# Patient Record
Sex: MALE | Race: AMERICAN INDIAN OR ALASKA NATIVE | NOT HISPANIC OR LATINO
[De-identification: names, ages, dates, MRNs, and addresses within clinical notes are randomized per-mention and may not be internally consistent; named-entity substitution may affect disease eponyms.]

---
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
## 2021-04-06 ENCOUNTER — TRANSCRIPTION ENCOUNTER (OUTPATIENT)
Age: 21
End: 2021-04-06

## 2022-02-24 ENCOUNTER — TRANSCRIPTION ENCOUNTER (OUTPATIENT)
Age: 22
End: 2022-02-24

## 2022-04-10 ENCOUNTER — TRANSCRIPTION ENCOUNTER (OUTPATIENT)
Age: 22
End: 2022-04-10

## 2022-05-22 NOTE — ED PEDIATRIC TRIAGE NOTE - NS ED TRIAGE AVPU SCALE
Skin normal color for race, warm, dry and intact. No evidence of rash. Alert-The patient is alert, awake and responds to voice. The patient is oriented to time, place, and person. The triage nurse is able to obtain subjective information.

## 2022-06-06 ENCOUNTER — NON-APPOINTMENT (OUTPATIENT)
Age: 22
End: 2022-06-06

## 2023-01-31 ENCOUNTER — NON-APPOINTMENT (OUTPATIENT)
Age: 23
End: 2023-01-31

## 2023-02-28 ENCOUNTER — RESULT CHARGE (OUTPATIENT)
Age: 23
End: 2023-02-28

## 2023-03-01 ENCOUNTER — APPOINTMENT (OUTPATIENT)
Dept: CARDIOLOGY | Facility: CLINIC | Age: 23
End: 2023-03-01
Payer: MEDICAID

## 2023-03-01 VITALS
HEART RATE: 87 BPM | RESPIRATION RATE: 20 BRPM | WEIGHT: 240 LBS | TEMPERATURE: 98.4 F | HEIGHT: 71 IN | BODY MASS INDEX: 33.6 KG/M2

## 2023-03-01 VITALS — SYSTOLIC BLOOD PRESSURE: 138 MMHG | DIASTOLIC BLOOD PRESSURE: 74 MMHG

## 2023-03-01 DIAGNOSIS — Z78.9 OTHER SPECIFIED HEALTH STATUS: ICD-10-CM

## 2023-03-01 PROCEDURE — 93000 ELECTROCARDIOGRAM COMPLETE: CPT

## 2023-03-01 PROCEDURE — 93306 TTE W/DOPPLER COMPLETE: CPT

## 2023-03-01 PROCEDURE — 99204 OFFICE O/P NEW MOD 45 MIN: CPT

## 2023-03-01 RX ORDER — VIT A AND D3 IN COD LIVER OIL 1250-135
CAPSULE ORAL
Refills: 0 | Status: ACTIVE | COMMUNITY

## 2023-03-01 NOTE — PHYSICAL EXAM
[Well Developed] : well developed [Well Nourished] : well nourished [No Acute Distress] : no acute distress [Normal Conjunctiva] : normal conjunctiva [Normal Venous Pressure] : normal venous pressure [No Carotid Bruit] : no carotid bruit [Normal S1, S2] : normal S1, S2 [No Murmur] : no murmur [No Rub] : no rub [No Gallop] : no gallop [Clear Lung Fields] : clear lung fields [Good Air Entry] : good air entry [No Respiratory Distress] : no respiratory distress  [Soft] : abdomen soft [Non Tender] : non-tender [No Masses/organomegaly] : no masses/organomegaly [Normal Bowel Sounds] : normal bowel sounds [Normal Gait] : normal gait [No Edema] : no edema [No Cyanosis] : no cyanosis [No Clubbing] : no clubbing [No Varicosities] : no varicosities [No Rash] : no rash [No Skin Lesions] : no skin lesions [Moves all extremities] : moves all extremities [No Focal Deficits] : no focal deficits [Alert and Oriented] : alert and oriented [Normal memory] : normal memory

## 2023-03-01 NOTE — HISTORY OF PRESENT ILLNESS
[FreeTextEntry1] : SANDRA MORALES is a 22 year old man with Autism who presents to establish care. \par \par He is here with his mother because she has noticed that his blood pressure is high at home, for example the SBP is in the 130-140 mmHg range. She has also noticed that his heart rate is high. The patient is unable to describe pain. She says she does not notice him clutching his chest or short of breath. He has been bursting into a sweat a lot lately  however. She has not noticed any leg swelling. He has not had any syncopal episodes. \par

## 2023-03-01 NOTE — ASSESSMENT
[FreeTextEntry1] : 22 year old man with Autism who presents to [facility] care. \par \par 1. HTN: BP above goal in the office today and above goal at home. His goal is <130/80 mmHg. \par - Echocardiogram to rule out structural abnormalities\par - We discussed the importance of doing 30 minutes of moderate intensity activity, at least 5 times a week\par - We discussed the importance of adhering to a low sodium diet of <2000 mg of sodium per day\par - We will try lifestyle modifications before starting medications\par - TSH is normal; will discuss secondary hypertension labs at next visit\par - They will keep a BP log and bring for me at next visit\par \par 2. HLD: Family history of CAD. His LDL is above goal of <100. Again, we will try lifestyle modifications and check lipid panel before next visit. \par \par RTC in one month. \par

## 2023-04-12 ENCOUNTER — APPOINTMENT (OUTPATIENT)
Dept: CARDIOLOGY | Facility: CLINIC | Age: 23
End: 2023-04-12
Payer: MEDICAID

## 2023-04-12 VITALS
HEIGHT: 71 IN | RESPIRATION RATE: 18 BRPM | SYSTOLIC BLOOD PRESSURE: 126 MMHG | WEIGHT: 236 LBS | HEART RATE: 86 BPM | OXYGEN SATURATION: 99 % | DIASTOLIC BLOOD PRESSURE: 84 MMHG | TEMPERATURE: 98.3 F | BODY MASS INDEX: 33.04 KG/M2

## 2023-04-12 VITALS — SYSTOLIC BLOOD PRESSURE: 126 MMHG | DIASTOLIC BLOOD PRESSURE: 84 MMHG

## 2023-04-12 DIAGNOSIS — Z01.810 ENCOUNTER FOR PREPROCEDURAL CARDIOVASCULAR EXAMINATION: ICD-10-CM

## 2023-04-12 PROCEDURE — 93000 ELECTROCARDIOGRAM COMPLETE: CPT

## 2023-04-12 PROCEDURE — 99214 OFFICE O/P EST MOD 30 MIN: CPT

## 2023-04-12 NOTE — CARDIOLOGY SUMMARY
[de-identified] : 4/12/23: sinus rhythm, nonspecific ST and T wave changes [de-identified] : 3/1/23: Normal left and right ventricular size and systolic function. No significant valve disease.\par

## 2023-04-12 NOTE — ASSESSMENT
[FreeTextEntry1] : 22 year old man with Autism, HLD and HTN who presents for follow up today. \par \par 1. Preoperative risk stratification: His functional capacity is >4 METS. He cannot describe anginal symptoms. EKG is nonischemic and echo is normal. He is at low risk for low risk surgery (wisdom tooth removal). He may proceed from a cardiovascular standpoint without further testing. \par This consult serves as cardiovascular risk stratification for surgery. The ultimate decision to proceed with the surgery lies with the patient and the surgeon.\par \par 2. HTN: BP is normal in the office but high at home. His goal is <130/80 mmHg. They will bring their home BP monitor next week and have it checked for accuracy. If accurate, will start amlodipine 2.5mg daily. \par \par 3. HLD: LDL improved from 140 to 120 with lifestyle modifications. Continue. \par \par RTC in 3 months.

## 2023-04-12 NOTE — HISTORY OF PRESENT ILLNESS
[FreeTextEntry1] : SANDRA MORALES is a 22 year old man with Autism, HLD and HTN who presents for follow up today. \par \par He is here with his mother and sister today. They have been checking BP every night and systolic is in the 160s.  The patient is unable to describe pain. She says she does not notice him clutching his chest or short of breath. He has been bursting into a sweat a lot lately  however. She has not noticed any leg swelling. He has not had any syncopal episodes. \par \par He goes to the gym four days a week and does cardio for 15-30 minutes without complaints and also does weights.

## 2023-04-12 NOTE — PHYSICAL EXAM
[Well Developed] : well developed [Well Nourished] : well nourished [No Acute Distress] : no acute distress [Normal Conjunctiva] : normal conjunctiva [Normal Venous Pressure] : normal venous pressure [No Carotid Bruit] : no carotid bruit [Normal S1, S2] : normal S1, S2 [No Murmur] : no murmur [No Rub] : no rub [No Gallop] : no gallop [Clear Lung Fields] : clear lung fields [Good Air Entry] : good air entry [No Respiratory Distress] : no respiratory distress  [Soft] : abdomen soft [Non Tender] : non-tender [No Masses/organomegaly] : no masses/organomegaly [Normal Bowel Sounds] : normal bowel sounds [Normal Gait] : normal gait [No Edema] : no edema [No Cyanosis] : no cyanosis [No Clubbing] : no clubbing [No Varicosities] : no varicosities [No Rash] : no rash [No Skin Lesions] : no skin lesions [Moves all extremities] : moves all extremities [Alert and Oriented] : alert and oriented [No Focal Deficits] : no focal deficits [Normal memory] : normal memory

## 2023-04-14 ENCOUNTER — APPOINTMENT (OUTPATIENT)
Dept: OTOLARYNGOLOGY | Facility: CLINIC | Age: 23
End: 2023-04-14
Payer: MEDICAID

## 2023-04-14 PROCEDURE — 69210 REMOVE IMPACTED EAR WAX UNI: CPT

## 2023-04-14 NOTE — ASSESSMENT
[FreeTextEntry1] : Wax cleaned.\par \par History and discussion with patient's mother and sister.\par \par

## 2023-04-14 NOTE — PHYSICAL EXAM
[de-identified] : right impacted wax cleaned with curette [] : septum deviated to the left [Normal] : no abnormal secretions

## 2023-04-14 NOTE — HISTORY OF PRESENT ILLNESS
[de-identified] : Patient presents today with his mom c/o allergies , sore throat,  ear infections,  nasal congestion.  Patient sister states his behavior gets bad when he has allergies.  He c/o throat pain  and a stuffy nose.   He has a history of recurring ear infections.

## 2023-04-15 ENCOUNTER — NON-APPOINTMENT (OUTPATIENT)
Age: 23
End: 2023-04-15

## 2023-05-23 ENCOUNTER — EMERGENCY (EMERGENCY)
Facility: HOSPITAL | Age: 23
LOS: 0 days | Discharge: HOME | End: 2023-05-23
Attending: EMERGENCY MEDICINE
Payer: MEDICAID

## 2023-05-23 VITALS
HEART RATE: 75 BPM | TEMPERATURE: 99 F | SYSTOLIC BLOOD PRESSURE: 161 MMHG | DIASTOLIC BLOOD PRESSURE: 94 MMHG | OXYGEN SATURATION: 98 % | RESPIRATION RATE: 18 BRPM

## 2023-05-23 DIAGNOSIS — R51.9 HEADACHE, UNSPECIFIED: ICD-10-CM

## 2023-05-23 DIAGNOSIS — F84.0 AUTISTIC DISORDER: ICD-10-CM

## 2023-05-23 DIAGNOSIS — Z88.0 ALLERGY STATUS TO PENICILLIN: ICD-10-CM

## 2023-05-23 PROCEDURE — 70450 CT HEAD/BRAIN W/O DYE: CPT | Mod: 26,MA

## 2023-05-23 PROCEDURE — 99284 EMERGENCY DEPT VISIT MOD MDM: CPT | Mod: 25

## 2023-05-23 PROCEDURE — 99284 EMERGENCY DEPT VISIT MOD MDM: CPT

## 2023-05-23 PROCEDURE — 70450 CT HEAD/BRAIN W/O DYE: CPT | Mod: MA

## 2023-05-23 RX ORDER — ACETAMINOPHEN 500 MG
650 TABLET ORAL ONCE
Refills: 0 | Status: COMPLETED | OUTPATIENT
Start: 2023-05-23 | End: 2023-05-23

## 2023-05-23 RX ORDER — METOCLOPRAMIDE HCL 10 MG
10 TABLET ORAL ONCE
Refills: 0 | Status: COMPLETED | OUTPATIENT
Start: 2023-05-23 | End: 2023-05-23

## 2023-05-23 RX ADMIN — Medication 10 MILLIGRAM(S): at 16:28

## 2023-05-23 RX ADMIN — Medication 650 MILLIGRAM(S): at 16:28

## 2023-05-23 NOTE — ED PROVIDER NOTE - CLINICAL SUMMARY MEDICAL DECISION MAKING FREE TEXT BOX
24 y.o. male, PMH of autism, non-verbal, BIB mom for evaluation. Mom states that for the last week, pt had a few episodes where he grabbed his head and appeared uncomfortable. Never passed out. Never has seizure-like activity. No fever/chills. Episodes usually last 10-15 min and pt goes back to normal. Patient has appointment with neurologist in July for MRI and EEG. On exam, pt in NAD, awake, watching cartoons, head NC/AT, CN II-XII intact, PEERL, EOMi, neck (-) midline tenderness, lungs CTA B/L, CV S1S2 regular, abdomen soft/NT/ND/(+)BS, ext (-) edema, motor 5/5x4, sensation intact, ambulating with steady gait. CT (-). Pt appears comfortable in the ED. Will d/c with neuro follow up.

## 2023-05-23 NOTE — ED PROVIDER NOTE - CARE PROVIDERS DIRECT ADDRESSES
[email or web address],DirectAddress_Unknown

## 2023-05-23 NOTE — ED PROVIDER NOTE - CARE PROVIDER_API CALL
Christian Ozuna)  Neurology  [address] [facility], [facility] [address]  [place] [address]  Phone: [phone]  Fax: [phone]  Follow Up Time: Routine    Chay Kirk  Neurology  [address] [place] [address]  Phone: [phone]  Fax: [phone]  Follow Up Time: Routine    Denia Adames)  Neurology; Vascular Neurology  [address] [place] [address], [address] [id number]  Phone: [phone]  Fax: [phone]  Follow Up Time: Routine    Cruz Wakefield)  Neurology  [address] [place] [address]  Phone: [phone]  Fax: [phone]  Follow Up Time: Routine

## 2023-05-23 NOTE — ED PROVIDER NOTE - OBJECTIVE STATEMENT
24-year-old male with past history of autism presents with complaints of headache and mother states patient has been staring off into space into one direction and not acting himself for the past few weeks.  Noticed denies any tonic-clonic movements and history of seizures denies any head trauma.  Mother denies any subjective fever.  Patient has appointment with neurologist in July for MRI and EEG.

## 2023-05-23 NOTE — ED PROVIDER NOTE - PATIENT PORTAL LINK FT
You can access the FollowMyHealth Patient Portal offered by [facility] by registering at the following website: [email or web address]. By joining [employer]’s FollowMyHealth portal, you will also be able to view your health information using other applications (apps) compatible with our system.

## 2023-05-23 NOTE — ED ADULT TRIAGE NOTE - NS ED NURSE BANDS TYPE
"Adult Outpatient Nutrition  Assessment/PES    Patient Name: Ary Soliz  YOB: 1990  MRN: [id number]      Assessment Date: 12/01/22      This medical referred consult was provided via telehealth as patient was unable to attend an in-office appointment today due to the COVID-19 crisis. Consent for treatment was given verbally. RD spent a total of 60 minutes with patient today.      Reason for visit:     Ary is a 31 yo female who is referred today for weight loss and healthy eating education.     Session Details:     Attendees: Ary  Language/communication details: Appointment via telephone   Barriers to learning: None noted  Health Literacy Assessment: Not completed prior to appointment    Anthropometrics:     Ht Readings from Last 1 Encounters:   09/15/17 157.5 cm (62\")      Wt Readings from Last 1 Encounters:   09/15/17 90.7 kg (200 lb)      BMI Readings from Last 1 Encounters:   09/15/17 36.58 kg/m²      Patient weight not recorded              Recent weight change: Slow progressive weight re-gain (50-60#)    Pertinent Labs:     None    Pertinent Medications/Supplements:    None     Nutrition Assessment:       Food insecurity: Denies  Food allergies: Denies   Intolerances/aversions: Denies  Difficulty chewing: Denies  Difficulty swallowing: Denies  Gastrointestinal symptoms that impact intake or food choices: Denies  Diet requirements related to medical condition, personal preference or cultural belief:  is vegetarian. Ary does eat meat but in limited amounts.  Previous nutrition education/information: None noted   Barriers to following/maintaining a diet/healthy eating plan: Knowing what to eat, portion control.  Support plan:     Diet recall:     24 hr recall available in Nutrition Assessment Questionnaire     Nutrition assessment comments: Ary follows a regular diet with limited intake of meat. 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 She grew up eating a lot of fast food and reports challenges with gauging how much to eat and what exactly to eat. She doesn't consistently skip meals, however she does skip meals on occasion.     Physical activity:      Barriers to physical activity: Not assessed    Physical activity comments: Not assessed       PES Statement:     Food and nutrition related knowledge deficit related to no prior work with RD on healthy eating as evidenced by referral to RD for education on healthy eating to support weight loss/healthy weight.    Discussion/Education:    Our conversation focused on the importance of eating throughout the day and avoiding large spans of time without eating. We discussed the idea of setting a consistent meal pattern and some different meal patterns she might consider. We discussed the idea of being realistic with her work and home schedule to set herself up for success. We also focused on meal content. We discussed a balanced meal and what makes up a healthy plate. We discussed the importance of including all macronutrients with each meal and the rationale for that. We also discussed examples of some of her meals and how they could be adjusted to better incorporate all foods to make a more balanced plate.     Intervention Goal(s):     1. Drink at least 64 oz water (or other non-caffeinated beverage) daily   2. Eat every 4-6 hours (meal or snack) during awake hours     Resources Provided:     1. [facility] Weight Loss Toolkit  2. Start Simply with My Plate   3. Healthy Snack Ideas  4. Quick meal ideas             Total of 60 minutes spent with patient on nutrition counseling. Education based on Academy of Nutrition and Dietetics guidelines. Patient was provided with RD's contact information. Follow up visit is scheduled for TBD. Thank you for this referral.                     Name band;

## 2023-05-23 NOTE — ED PROVIDER NOTE - NSFOLLOWUPINSTRUCTIONS_ED_ALL_ED_FT
Headache    A headache is pain or discomfort felt around the head or neck area. The specific cause of a headache may not be found as there are many types including tension headaches, migraine headaches, and cluster headaches. Watch your condition for any changes. Things you can do to manage your pain include taking over the counter and prescription medications as instructed by your health care provider, lying down in a dark quiet room, limiting stress, getting regular sleep, and refraining from alcohol and tobacco products.    SEEK IMMEDIATE MEDICAL CARE IF YOU HAVE ANY OF THE FOLLOWING SYMPTOMS: fever, vomiting, stiff neck, loss of vision, problems with speech, muscle weakness, loss of balance, trouble walking, passing out, or confusion.    HERE ARE SOME NEUROLOGIST WHO MAY BE ABLE TO HELP YOU AND THEIR LOCATIONS:     [facility]  NEUROLOGY OFFICE LOCATIONS AND PHONE NUMBERS      [address], [address], [place] [address]  Opens in [employer]      [phone]    [address] [place] [address], [address] [address]  Opens in [employer]      [phone]    [address] [place] [address]  Opens in [employer]      [phone]    [address] [place] [address]  Opens in [employer]      [phone]    [address] [place] [address]  Opens in [employer]      [phone]    [address] [place] [address]  Opens in [employer]      [phone]      [facility]  NEUROLOGY      [phone]

## 2023-05-23 NOTE — ED ADULT NURSE NOTE - NSFALLUNIVINTERV_ED_ALL_ED
Bed/Stretcher in lowest position, wheels locked, appropriate side rails in place/Call bell, personal items and telephone in reach/Instruct patient to call for assistance before getting out of bed/chair/stretcher/Non-slip footwear applied when patient is off stretcher/[place] to call system/Physically safe environment - no spills, clutter or unnecessary equipment/Purposeful proactive rounding/Room/bathroom lighting operational, light cord in reach

## 2023-05-23 NOTE — ED PROVIDER NOTE - PROVIDER TOKENS
PROVIDER:[TOKEN:[[id number]],FOLLOWUP:[Routine]],PROVIDER:[TOKEN:[[id number]],FOLLOWUP:[Routine]],PROVIDER:[TOKEN:[[id number]],FOLLOWUP:[Routine]],PROVIDER:[TOKEN:[[id number]],FOLLOWUP:[Routine]]

## 2023-06-11 ENCOUNTER — RESULT CHARGE (OUTPATIENT)
Age: 23
End: 2023-06-11

## 2023-06-12 ENCOUNTER — APPOINTMENT (OUTPATIENT)
Dept: CARDIOLOGY | Facility: CLINIC | Age: 23
End: 2023-06-12
Payer: MEDICAID

## 2023-06-12 VITALS
WEIGHT: 239 LBS | TEMPERATURE: 98.3 F | RESPIRATION RATE: 20 BRPM | DIASTOLIC BLOOD PRESSURE: 78 MMHG | HEIGHT: 71 IN | OXYGEN SATURATION: 97 % | SYSTOLIC BLOOD PRESSURE: 124 MMHG | BODY MASS INDEX: 33.46 KG/M2 | HEART RATE: 90 BPM

## 2023-06-12 PROCEDURE — 99214 OFFICE O/P EST MOD 30 MIN: CPT

## 2023-06-12 PROCEDURE — 93000 ELECTROCARDIOGRAM COMPLETE: CPT

## 2023-06-12 NOTE — HISTORY OF PRESENT ILLNESS
[FreeTextEntry1] : SANDRA MORALES is a 23 year old man with Autism, HLD and HTN who presents for follow up today. \par \par He is here with his mom today. His SBP is in the 120s at home, however we checked his machine against ours today and SBP on the home machine is about 20 points higher (cuff is too small). The patient is unable to describe pain. She says she does not notice him clutching his chest or short of breath. He has been bursting into a sweat a lot lately  however. She has not noticed any leg swelling. He has not had any syncopal episodes. \par \par He goes to the gym four days a week and does cardio for 15-30 minutes without complaints and also does weights.

## 2023-06-12 NOTE — ASSESSMENT
[FreeTextEntry1] : 23 year old man with Autism, HLD and HTN who presents for follow up today. \par \par 1. HTN: His mom did not start amlodipine yet. His BP in the office is at goal. His goal is <130/80 mmHg. The home machine reads about 20 points higher than office, I think because cuff is too small. His mom is going to work on getting a bigger cuff and then will call us with readings. \par \par 3. HLD: LDL improved from 140 to 120 with lifestyle modifications. Continue. \par \par The primary prevention of heart disease was discussed in detail with the patient, including adhering to a heart healthy, plant based, or Mediterranean diet, and the importance of 30 minutes of moderate intensity activity for 30 minutes, 5 times a week. All the patient's questions were answered.\par \par \par RTC in 3 months.

## 2023-06-12 NOTE — CARDIOLOGY SUMMARY
[de-identified] : 6/12/23: sinus rhythm, nonspecific ST and T wave changes  [de-identified] : 3/1/23: Normal left and right ventricular size and systolic function. No significant valve disease

## 2023-07-06 ENCOUNTER — INPATIENT (INPATIENT)
Facility: HOSPITAL | Age: 23
LOS: 2 days | Discharge: HOME HEALTH | DRG: 757 | End: 2023-07-09
Attending: STUDENT IN AN ORGANIZED HEALTH CARE EDUCATION/TRAINING PROGRAM | Admitting: HOSPITALIST
Payer: MEDICAID

## 2023-07-06 VITALS — WEIGHT: 220.02 LBS

## 2023-07-06 DIAGNOSIS — Z74.3 NEED FOR CONTINUOUS SUPERVISION: ICD-10-CM

## 2023-07-06 DIAGNOSIS — F84.0 AUTISTIC DISORDER: ICD-10-CM

## 2023-07-06 DIAGNOSIS — Z78.1 PHYSICAL RESTRAINT STATUS: ICD-10-CM

## 2023-07-06 DIAGNOSIS — Z79.899 OTHER LONG TERM (CURRENT) DRUG THERAPY: ICD-10-CM

## 2023-07-06 DIAGNOSIS — R56.9 UNSPECIFIED CONVULSIONS: ICD-10-CM

## 2023-07-06 DIAGNOSIS — R45.1 RESTLESSNESS AND AGITATION: ICD-10-CM

## 2023-07-06 DIAGNOSIS — Z88.0 ALLERGY STATUS TO PENICILLIN: ICD-10-CM

## 2023-07-06 LAB
BASOPHILS # BLD AUTO: 0.05 K/UL — SIGNIFICANT CHANGE UP (ref 0–0.2)
BASOPHILS NFR BLD AUTO: 0.9 % — SIGNIFICANT CHANGE UP (ref 0–1)
EOSINOPHIL # BLD AUTO: 0.12 K/UL — SIGNIFICANT CHANGE UP (ref 0–0.7)
EOSINOPHIL NFR BLD AUTO: 2.3 % — SIGNIFICANT CHANGE UP (ref 0–8)
HCT VFR BLD CALC: 44.4 % — SIGNIFICANT CHANGE UP (ref 42–52)
HGB BLD-MCNC: 15 G/DL — SIGNIFICANT CHANGE UP (ref 14–18)
IMM GRANULOCYTES NFR BLD AUTO: 0.4 % — HIGH (ref 0.1–0.3)
LYMPHOCYTES # BLD AUTO: 2.14 K/UL — SIGNIFICANT CHANGE UP (ref 1.2–3.4)
LYMPHOCYTES # BLD AUTO: 40.2 % — SIGNIFICANT CHANGE UP (ref 20.5–51.1)
MCHC RBC-ENTMCNC: 28.4 PG — SIGNIFICANT CHANGE UP (ref 27–31)
MCHC RBC-ENTMCNC: 33.8 G/DL — SIGNIFICANT CHANGE UP (ref 32–37)
MCV RBC AUTO: 84.1 FL — SIGNIFICANT CHANGE UP (ref 80–94)
MONOCYTES # BLD AUTO: 0.48 K/UL — SIGNIFICANT CHANGE UP (ref 0.1–0.6)
MONOCYTES NFR BLD AUTO: 9 % — SIGNIFICANT CHANGE UP (ref 1.7–9.3)
NEUTROPHILS # BLD AUTO: 2.52 K/UL — SIGNIFICANT CHANGE UP (ref 1.4–6.5)
NEUTROPHILS NFR BLD AUTO: 47.2 % — SIGNIFICANT CHANGE UP (ref 42.2–75.2)
NRBC # BLD: 0 /100 WBCS — SIGNIFICANT CHANGE UP (ref 0–0)
PLATELET # BLD AUTO: 270 K/UL — SIGNIFICANT CHANGE UP (ref 130–400)
PMV BLD: 10.1 FL — SIGNIFICANT CHANGE UP (ref 7.4–10.4)
RBC # BLD: 5.28 M/UL — SIGNIFICANT CHANGE UP (ref 4.7–6.1)
RBC # FLD: 13.2 % — SIGNIFICANT CHANGE UP (ref 11.5–14.5)
WBC # BLD: 5.33 K/UL — SIGNIFICANT CHANGE UP (ref 4.8–10.8)
WBC # FLD AUTO: 5.33 K/UL — SIGNIFICANT CHANGE UP (ref 4.8–10.8)

## 2023-07-06 PROCEDURE — 85025 COMPLETE CBC W/AUTO DIFF WBC: CPT

## 2023-07-06 PROCEDURE — 95716 VEEG EA 12-26HR CONT MNTR: CPT

## 2023-07-06 PROCEDURE — 80053 COMPREHEN METABOLIC PANEL: CPT

## 2023-07-06 PROCEDURE — 81003 URINALYSIS AUTO W/O SCOPE: CPT

## 2023-07-06 PROCEDURE — 95700 EEG CONT REC W/VID EEG TECH: CPT

## 2023-07-06 PROCEDURE — 70450 CT HEAD/BRAIN W/O DYE: CPT

## 2023-07-06 PROCEDURE — 36415 COLL VENOUS BLD VENIPUNCTURE: CPT

## 2023-07-06 PROCEDURE — 99285 EMERGENCY DEPT VISIT HI MDM: CPT

## 2023-07-06 NOTE — ED ADULT NURSE NOTE - OBJECTIVE STATEMENT
Pt with complaints of having aggressive behaviors towards mom  when he has this sudden onset of blank stairs and then starts grabbing mom. Seen in [facility] few weeks ago and started him on new psyche meds.

## 2023-07-06 NOTE — ED PROVIDER NOTE - CLINICAL SUMMARY MEDICAL DECISION MAKING FREE TEXT BOX
23-year-old male presents to the ED for aggressive behavior.  Progressively worsening episodes.  History of autism.  Recently started on antipsychotics.  Difficulty with managing child symptoms at home.  Physical exam is unremarkable.  At baseline.  Prior history of seizures mother was concerned.  Consulted neurology with recommendations for VEEG.  Patient was admitted to the [facility]. CTH neg for ich, midline shift, or hydrocephalus.  Labs reviewed by me, values noted to be within normal limits.

## 2023-07-06 NOTE — ED PROVIDER NOTE - OBJECTIVE STATEMENT
Patient is a 24-year-old male with past history of autism presents with mom for aggressive behavior. Mother endorses several months of worsening aggressive behavior and episodes of staring off into space into one direction and not acting himself. Patient was recently started on antipsychotic medication but mother has failed to see improvement and she believes he needs an EEG/seizure workup. Mother states she has been experiencing issues with scheduling her son with an appointment due to his autism. Mother denies any fevers, nuchal rigidity, seizure-like activity, urinary symptoms, or additional acute issues. Patient is a 23-year-old male with past history of autism presents with mom for aggressive behavior. Mother endorses several months of worsening aggressive behavior and episodes of staring off into space into one direction and not acting himself. Patient was recently started on antipsychotic medication but mother has failed to see improvement and she believes he needs an EEG/seizure workup. Mother states she has been experiencing issues with scheduling her son with an appointment due to his autism. Mother denies any fevers, nuchal rigidity, seizure-like activity, urinary symptoms, or additional acute issues.

## 2023-07-06 NOTE — ED ADULT NURSE NOTE - NSFALLHARMRISKINTERV_ED_ALL_ED
Assistance OOB with selected safe patient handling equipment if applicable/Communicate risk of Fall with Harm to all staff, patient, and family/Encourage patient to sit up slowly, dangle for a short time, stand at bedside before walking/Orthostatic vital signs/Provide visual cue: red socks, yellow wristband, yellow gown, etc/Reinforce activity limits and safety measures with patient and family/Review medications for side effects contributing to fall risk/Toileting schedule using arm’s reach rule for commode and bathroom/Bed in lowest position, wheels locked, appropriate side rails in place/Call bell, personal items and telephone in reach/Instruct patient to call for assistance before getting out of bed/chair/stretcher/Non-slip footwear applied when patient is off stretcher/[place] to call system/Physically safe environment - no spills, clutter or unnecessary equipment/Purposeful Proactive Rounding/Room/bathroom lighting operational, light cord in reach

## 2023-07-06 NOTE — ED ADULT NURSE NOTE - NSFALLLASTSIX_ED_ALL_ED
Patient verified name and . Pt states last name is now Suzanne Jameson. Order for consent found in EHR and matches case posting; patient verifies procedure. Bilateral revision of reconstructed breasts with implant exchange, placement of acellular dermal matrix, elevation of right IMF, and autologous fat grafting    Type 2 surgery, PAT phone assessment complete. Orders received. Labs per surgeon: none  Labs per anesthesia protocol: Hgb- Pt instructed to come for lab work at entrance B (Monday- Friday 8:30 AM- 4:00 PM) prior to surgery day. Pt voiced an understanding. Patient answered medical/surgical history questions at their best of ability. All prior to admission medications documented in [facility]. Patient instructed to take the following medications the day of surgery according to anesthesia guidelines with a small sip of water: adderall, zyrtec, albuterol inhaler (bring) and prilosec. Hold all vitamins/supplements 7 days prior to surgery and NSAIDS 5 days prior to surgery. Patient instructed on the following:  Arrive at A Entrance, time of arrival to be called the day before by 1700  NPO after midnight including gum, mints, and ice chips  Responsible adult must drive patient to the hospital, stay during surgery, and patient will need supervision 24 hours after anesthesia  Use antibacterial soap in shower the night before surgery and on the morning of surgery  All piercings must be removed prior to arrival.    Leave all valuables (money and jewelry) at home but bring insurance card and ID on  DOS. Do not wear make-up, nail polish, lotions, cologne, perfumes, powders, or oil on skin. Patient teach back successful and patient demonstrates knowledge of instruction.
No.

## 2023-07-06 NOTE — ED ADULT NURSE NOTE - CHIEF COMPLAINT QUOTE
PT presents to the ED c/o of medical eval. Pt comes in with h/x of autism and has been acting more aggressive to mom recently and acting "differently" per mom. This has been going on since April. Pt placed in isolation room due to people excite him. Pt refusing to let RN or PCA due vitals in triage.

## 2023-07-06 NOTE — ED PROVIDER NOTE - PHYSICAL EXAMINATION
Patient is a 24-year-old male with past history of autism presents with mom for aggressive behavior. Mother endorses several months of worsening aggressive behavior and episodes of staring off into space into one direction and not acting himself. Patient was recently started on antipsychotic medication but mother has failed to see improvement and she believes he needs an EEG/seizure workup. Mother states she has been experiencing issues with scheduling her son with an appointment due to his autism. Mother denies any fevers, nuchal rigidity, seizure-like activity, urinary symptoms, or additional acute issues. VITAL SIGNS: I have reviewed nursing notes and confirm.  CONSTITUTIONAL: Non-toxic, in NAD  SKIN: Warm dry, normal skin turgor  HEAD: NCAT  EYES: No conjunctival injection, scleral anicteric  ENT: Moist mucous membranes, normal pharynx with no erythema or exudates  NECK: Supple; full ROM. Nontender. No cervical LAD  CARD: RRR, no murmurs, rubs or gallops  RESP: Clear to ausculation bilaterally.    ABD: Soft, non-distended, non-tender  EXT: Full ROM,  NEURO: Normal motor, normal sensory. Normal gait.  PSYCH: Cooperative, appropriate.

## 2023-07-06 NOTE — ED ADULT NURSE REASSESSMENT NOTE - NSFALLUNIVINTERV_ED_ALL_ED
Bed/Stretcher in lowest position, wheels locked, appropriate side rails in place/Call bell, personal items and telephone in reach/Instruct patient to call for assistance before getting out of bed/chair/stretcher/Non-slip footwear applied when patient is off stretcher/[place] to call system/Physically safe environment - no spills, clutter or unnecessary equipment/Purposeful proactive rounding/Room/bathroom lighting operational, light cord in reach

## 2023-07-06 NOTE — ED ADULT NURSE REASSESSMENT NOTE - NSFALLHARMRISKINTERV_ED_ALL_ED

## 2023-07-07 DIAGNOSIS — R41.82 ALTERED MENTAL STATUS, UNSPECIFIED: ICD-10-CM

## 2023-07-07 LAB
ALBUMIN SERPL ELPH-MCNC: 4.1 G/DL — SIGNIFICANT CHANGE UP (ref 3.5–5.2)
ALP SERPL-CCNC: 44 U/L — SIGNIFICANT CHANGE UP (ref 30–115)
ALT FLD-CCNC: 34 U/L — SIGNIFICANT CHANGE UP (ref 0–41)
ANION GAP SERPL CALC-SCNC: 10 MMOL/L — SIGNIFICANT CHANGE UP (ref 7–14)
ANION GAP SERPL CALC-SCNC: 12 MMOL/L — SIGNIFICANT CHANGE UP (ref 7–14)
APPEARANCE UR: CLEAR — SIGNIFICANT CHANGE UP
AST SERPL-CCNC: 20 U/L — SIGNIFICANT CHANGE UP (ref 0–41)
BASOPHILS # BLD AUTO: 0.05 K/UL — SIGNIFICANT CHANGE UP (ref 0–0.2)
BASOPHILS NFR BLD AUTO: 1.1 % — HIGH (ref 0–1)
BILIRUB SERPL-MCNC: 0.2 MG/DL — SIGNIFICANT CHANGE UP (ref 0.2–1.2)
BILIRUB UR-MCNC: NEGATIVE — SIGNIFICANT CHANGE UP
BUN SERPL-MCNC: 11 MG/DL — SIGNIFICANT CHANGE UP (ref 10–20)
BUN SERPL-MCNC: 11 MG/DL — SIGNIFICANT CHANGE UP (ref 10–20)
CALCIUM SERPL-MCNC: 9.6 MG/DL — SIGNIFICANT CHANGE UP (ref 8.4–10.5)
CALCIUM SERPL-MCNC: 9.9 MG/DL — SIGNIFICANT CHANGE UP (ref 8.4–10.5)
CHLORIDE SERPL-SCNC: 102 MMOL/L — SIGNIFICANT CHANGE UP (ref 98–110)
CHLORIDE SERPL-SCNC: 104 MMOL/L — SIGNIFICANT CHANGE UP (ref 98–110)
CO2 SERPL-SCNC: 23 MMOL/L — SIGNIFICANT CHANGE UP (ref 17–32)
CO2 SERPL-SCNC: 25 MMOL/L — SIGNIFICANT CHANGE UP (ref 17–32)
COLOR SPEC: SIGNIFICANT CHANGE UP
CREAT SERPL-MCNC: 0.8 MG/DL — SIGNIFICANT CHANGE UP (ref 0.7–1.5)
CREAT SERPL-MCNC: 0.8 MG/DL — SIGNIFICANT CHANGE UP (ref 0.7–1.5)
DIFF PNL FLD: NEGATIVE — SIGNIFICANT CHANGE UP
EGFR: 128 ML/MIN/1.73M2 — SIGNIFICANT CHANGE UP
EGFR: 128 ML/MIN/1.73M2 — SIGNIFICANT CHANGE UP
EOSINOPHIL # BLD AUTO: 0.11 K/UL — SIGNIFICANT CHANGE UP (ref 0–0.7)
EOSINOPHIL NFR BLD AUTO: 2.4 % — SIGNIFICANT CHANGE UP (ref 0–8)
GLUCOSE SERPL-MCNC: 113 MG/DL — HIGH (ref 70–99)
GLUCOSE SERPL-MCNC: 98 MG/DL — SIGNIFICANT CHANGE UP (ref 70–99)
GLUCOSE UR QL: NEGATIVE — SIGNIFICANT CHANGE UP
HCT VFR BLD CALC: 45 % — SIGNIFICANT CHANGE UP (ref 42–52)
HGB BLD-MCNC: 14.8 G/DL — SIGNIFICANT CHANGE UP (ref 14–18)
IMM GRANULOCYTES NFR BLD AUTO: 0.4 % — HIGH (ref 0.1–0.3)
KETONES UR-MCNC: NEGATIVE — SIGNIFICANT CHANGE UP
LACTATE SERPL-SCNC: 1.9 MMOL/L — SIGNIFICANT CHANGE UP (ref 0.7–2)
LEUKOCYTE ESTERASE UR-ACNC: NEGATIVE — SIGNIFICANT CHANGE UP
LYMPHOCYTES # BLD AUTO: 1.86 K/UL — SIGNIFICANT CHANGE UP (ref 1.2–3.4)
LYMPHOCYTES # BLD AUTO: 40.7 % — SIGNIFICANT CHANGE UP (ref 20.5–51.1)
MAGNESIUM SERPL-MCNC: 2 MG/DL — SIGNIFICANT CHANGE UP (ref 1.8–2.4)
MCHC RBC-ENTMCNC: 27.7 PG — SIGNIFICANT CHANGE UP (ref 27–31)
MCHC RBC-ENTMCNC: 32.9 G/DL — SIGNIFICANT CHANGE UP (ref 32–37)
MCV RBC AUTO: 84.3 FL — SIGNIFICANT CHANGE UP (ref 80–94)
MONOCYTES # BLD AUTO: 0.47 K/UL — SIGNIFICANT CHANGE UP (ref 0.1–0.6)
MONOCYTES NFR BLD AUTO: 10.3 % — HIGH (ref 1.7–9.3)
NEUTROPHILS # BLD AUTO: 2.06 K/UL — SIGNIFICANT CHANGE UP (ref 1.4–6.5)
NEUTROPHILS NFR BLD AUTO: 45.1 % — SIGNIFICANT CHANGE UP (ref 42.2–75.2)
NITRITE UR-MCNC: NEGATIVE — SIGNIFICANT CHANGE UP
NRBC # BLD: 0 /100 WBCS — SIGNIFICANT CHANGE UP (ref 0–0)
PH UR: 6 — SIGNIFICANT CHANGE UP (ref 5–8)
PHOSPHATE SERPL-MCNC: 3.9 MG/DL — SIGNIFICANT CHANGE UP (ref 2.1–4.9)
PLATELET # BLD AUTO: 233 K/UL — SIGNIFICANT CHANGE UP (ref 130–400)
PMV BLD: 9.9 FL — SIGNIFICANT CHANGE UP (ref 7.4–10.4)
POTASSIUM SERPL-MCNC: 3.9 MMOL/L — SIGNIFICANT CHANGE UP (ref 3.5–5)
POTASSIUM SERPL-MCNC: 4.6 MMOL/L — SIGNIFICANT CHANGE UP (ref 3.5–5)
POTASSIUM SERPL-SCNC: 3.9 MMOL/L — SIGNIFICANT CHANGE UP (ref 3.5–5)
POTASSIUM SERPL-SCNC: 4.6 MMOL/L — SIGNIFICANT CHANGE UP (ref 3.5–5)
PROT SERPL-MCNC: 6.5 G/DL — SIGNIFICANT CHANGE UP (ref 6–8)
PROT UR-MCNC: SIGNIFICANT CHANGE UP
RBC # BLD: 5.34 M/UL — SIGNIFICANT CHANGE UP (ref 4.7–6.1)
RBC # FLD: 13.2 % — SIGNIFICANT CHANGE UP (ref 11.5–14.5)
SODIUM SERPL-SCNC: 137 MMOL/L — SIGNIFICANT CHANGE UP (ref 135–146)
SODIUM SERPL-SCNC: 139 MMOL/L — SIGNIFICANT CHANGE UP (ref 135–146)
SP GR SPEC: 1.02 — SIGNIFICANT CHANGE UP (ref 1.01–1.03)
UROBILINOGEN FLD QL: SIGNIFICANT CHANGE UP
WBC # BLD: 4.57 K/UL — LOW (ref 4.8–10.8)
WBC # FLD AUTO: 4.57 K/UL — LOW (ref 4.8–10.8)

## 2023-07-07 PROCEDURE — 99233 SBSQ HOSP IP/OBS HIGH 50: CPT | Mod: 25

## 2023-07-07 RX ORDER — ACETAMINOPHEN 500 MG
650 TABLET ORAL EVERY 6 HOURS
Refills: 0 | Status: DISCONTINUED | OUTPATIENT
Start: 2023-07-07 | End: 2023-07-09

## 2023-07-07 RX ORDER — ARIPIPRAZOLE 15 MG/1
7.5 TABLET ORAL DAILY
Refills: 0 | Status: DISCONTINUED | OUTPATIENT
Start: 2023-07-07 | End: 2023-07-09

## 2023-07-07 RX ORDER — ONDANSETRON 8 MG/1
4 TABLET, FILM COATED ORAL EVERY 8 HOURS
Refills: 0 | Status: DISCONTINUED | OUTPATIENT
Start: 2023-07-07 | End: 2023-07-09

## 2023-07-07 RX ORDER — LANOLIN ALCOHOL/MO/W.PET/CERES
3 CREAM (GRAM) TOPICAL AT BEDTIME
Refills: 0 | Status: DISCONTINUED | OUTPATIENT
Start: 2023-07-07 | End: 2023-07-09

## 2023-07-07 RX ADMIN — ARIPIPRAZOLE 7.5 MILLIGRAM(S): 15 TABLET ORAL at 10:39

## 2023-07-07 RX ADMIN — Medication 2 MILLIGRAM(S): at 01:14

## 2023-07-07 RX ADMIN — Medication 2 MILLIGRAM(S): at 16:13

## 2023-07-07 NOTE — ED ADULT NURSE REASSESSMENT NOTE - GENERAL PATIENT STATE
anxious/family/SO at bedside
comfortable appearance/family/SO at bedside
comfortable appearance
comfortable appearance/cooperative

## 2023-07-07 NOTE — CONSULT NOTE ADULT - ATTENDING COMMENTS
Patient seen and examined and agree with above except as noted.  Patients history, notes, labs, imaging, vitals and meds reviewed personally.  Patient able ot mimic sometimes follows commands  Most of history from mother at bedside  Was started recently on abilify 5mg increased to 7.5mg and thorazine given for aggressive behaviors  Also was told to use 75mg Benadryl  Has had no further episodes since admission    Plan  1. Can obtain VEEG at [facility] if no beds available at [facility]  2. May need MRI brain under sedation if VEEG shows abnormalities

## 2023-07-07 NOTE — CONSULT NOTE ADULT - ASSESSMENT
This is a 22yo w/ Autism (dx 18no) who presentes to the family w stear spells and acute behavioral stager.         Recoomendations  Admit to [facility] for vEEG This is a 24yo w/ Autism (dx 18mo) brought in by family for unilateral gaze and unusual behavior. This is his 4th event of similar presentation, suspicious for non-convulsive focal w/ CINDA and post-ictal confusion/agitation. Will admit to [facility] for vEEG monitoring to capture and characterize presenting events.         Recommendations  Admit to [facility] for continuous vEEG  No AED at this time   Q8 Neuro/vital check   [place] / FAll / seizure precaution  Plan d/w Family at bedside       Please call for any neurological changes and/or concerns     Case d/w  Dr. Alexander

## 2023-07-07 NOTE — H&P ADULT - HISTORY OF PRESENT ILLNESS
***Hx and physical limited due to pt being a poor historian. Supplemental information obtained from family, house staff, and EMR    22 YO M w/ a PMH of Autism who was brought into the hospital by his mother for eval of increasing agitation over the past several months. Associated w/ staring episodes. Unable to obtain ROS.     In the ED, seen by Neurology, plan to transfer to [facility] EMU.     FMHx:   -No family Hx of early cardiac death, CAD, asthma, or genetic disorders identified  -A comprehensive family history was taken and there were no identified disorders in the pt's relatives    Physical exam shows pt in NAD. VSS, afebrile, not hypoxic on RA. Neuro exam without deficits, motor/sensory intact, no dysarthria, no facial asymmetry. Muscle strength/sensation intact. CTA B/L with no W/C/R. RRR, no M/G/R. ABD is soft and non-tender, normoactive BSs. LEs without swelling. No rashes. Labs and radiology as above.     Increasing agitation + staring episodes, rule out seizure disorder. Neurology is following.Ativan PRN. Transfer to [facility] EMU. Restart home meds. Seizure precautions.     HX of Autism. Restart home meds, except as stated above. DVT PPX. Inform PCP of pt's admission to hospital. My note supersedes the residents note.     Date seen by Attendin23 ***Hx and physical limited due to pt being a poor historian. Supplemental information obtained from family, house staff, and EMR    24 YO M w/ a PMH of Autism who was brought into the hospital by his mother for eval of increasing agitation over the past several months. Associated w/ staring episodes. Unable to obtain ROS.     In the ED, seen by Neurology, plan to transfer to [facility] EMU.     FMHx:   -No family Hx of early cardiac death, CAD, asthma, or genetic disorders identified    Physical exam shows pt in NAD. VSS, afebrile, not hypoxic on RA. Neuro exam without deficits, motor/sensory intact, no dysarthria, no facial asymmetry. Muscle strength/sensation intact. CTA B/L with no W/C/R. RRR, no M/G/R. ABD is soft and non-tender, normoactive BSs. LEs without swelling. No rashes. Labs and radiology as above.     Increasing agitation + staring episodes, rule out seizure disorder. Neurology is following.Ativan PRN. Transfer to [facility] EMU. Restart home meds. Seizure precautions.   -Accepted for Transfer by Dr. Arcos at 0032    HX of Autism. Restart home meds, except as stated above. DVT PPX. Inform PCP of pt's admission to hospital. My note supersedes the residents note.     Date seen by Attendin23

## 2023-07-07 NOTE — CONSULT NOTE ADULT - SUBJECTIVE AND OBJECTIVE BOX
HPI  Mr. Degroot is a 23y Male w/ Autism and Vitamin B12 insufficiency brought in by family for sudden changes in behavior and unilateral eye gaze.     Family reported that today he was having Pizza at the shop when he suddenly got up, eyes looking to the side and he kept repeating internet over and over. This episode lasted for 2 mins then Pt proceed to be aggressive towards in mother who was trying to get his attention. He then fell to the floor carrying his mother to the floor with him. He Laid on the floor for some mins still unable to break is attention. Family reported a total of 2hrs before he would interact however, no recollection of the event. Family stated that this is the 4th time since 06/16/2023 which was the first event of similar presentation. They reported 2 episode on 06/16 and 1 episode last weekend. During the first episode in June he was taken to [facility] and started on Abilify 5mg for assumed Autistic behavior presentation and increased to 7.5mg last week after 3rd reported event. She state that she also noticed that right before his events he would pull his shirt to his nose as if he had smelled something. There was 1 instance that he woke up soiled in urine and another time when he had defecate on himself. Family denied blood from the mouth, convulsions, clothing soiled w/ urine or bladder. The also denied any recent illness, sick contact or change in routine.     Sister also reported that she noticed that his behavior has been slowly changing over the last few months in which he has been talking and communicating less as well as poor interaction in the things he liked such as time spent at the mall, and other social interaction.      Epilepsy risk factors: Autism dx 18months old   Head injury with subsequent LOC?: no  Febrile seizures in infancy?: no  Hx of CNS infection?: no  Family hx of epilepsy?: 1 Episode of seizure in sister w/ post-ictal agitation   Known CNS pathology?: no  Birth history: Full term vaginal delivery w/o extended hospital stay. 2 weeks postpartum he was admitted for FUO, CSF studies was neg for meningitis.   Prior AEDs : No   Prior imaging: Unremarkable CTH. NO MRI  Prior EEGs: None  Other diagnostic work-up: None     ROS  Per HPI, otherwise negative for constitutional/eyes/ENMT/cardiovascular/respiratory/GI//musculoskeletal/skin/breasts/psych/hem/lymph.     PAST MEDICAL & SURGICAL HISTORY:  Autism  Vitamin B insufficiency     No significant past surgical history     FAMILY HISTORY: 1 episode of Seizure in sister      Social History: NO Alcohol, illicits, smoking?:  Driving?: N/A  Occupation: program      Allergies: penicillin (Short breath; Rash)    Home Meds:  Vitamin B shot  monthly and Vitamin D weekly  ARIPiprazole 7.5 milliGRAM(s) Oral daily     MEDICATIONS  (STANDING):  ARIPiprazole 7.5 milliGRAM(s) Oral daily    MEDICATIONS  (PRN):  acetaminophen     Tablet .. 650 milliGRAM(s) Oral every 6 hours PRN Temp greater or equal to 38C (100.4F), Mild Pain (1 - 3)  aluminum hydroxide/magnesium hydroxide/simethicone Suspension 30 milliLiter(s) Oral every 4 hours PRN Dyspepsia  LORazepam   Injectable 2 milliGRAM(s) IV Push every 2 hours PRN seizure  melatonin 3 milliGRAM(s) Oral at bedtime PRN Insomnia  ondansetron Injectable 4 milliGRAM(s) IV Push every 8 hours PRN Nausea and/or Vomiting       T(C): 36.6 (07-06-23 @ 23:55), Max: 36.6 (07-06-23 @ 23:55)  HR: 104 (07-06-23 @ 23:55) (104 - 104)  BP: 141/85 (07-06-23 @ 23:55) (141/85 - 141/85)  RR: 20 (07-06-23 @ 23:55) (20 - 20)  SpO2: 97% (07-06-23 @ 23:55) (97% - 97%)    gen: no acute distress. calm and interactive   HEENT: trachea midline, no jaundice or icterus.  Pulm: no use of accessory muscles   Abd: soft, nt, nd  Ext: well-perfused, no edema.  Alertness: eyes open, pt attentive to examiner.  Orientation: Person only   Language: naming intact. Repetition intact. No paraphasias or neologisms. Fluent with appropriate sentences.  II: Visual fields grossly intact.  III, IV, VI: EOMI. No nystagmus. Anisocoria of OS. PRRL.   V: intact to light touch.  VII: smile symmetrical. Eyebrow elevation symmetrical. Eye closure symmetrical. No flattening of nasolabial fold.  VIII: deferred   IX, X: palate elevation symmetrical.  XI: sternocleidomastoid 5R/5L, trapezius 5R/5L  XII: no tongue deviation.  Motor: no atrophy or fasciculations. No tremor. No hypo/hyperkinesia. No pronator drift. Tone normal. Finger tap rapid and equal on both sides. Strength: deltoids 5R/5L, biceps 5R/5L, triceps 5R/5L, wrist flexors 5R/5L, wrist extensors 5R/5L,  strong and equal R/L, hip flexors 5R/5L, leg flexors 5R/5L, leg extensors 5R/5L, ankle plantarflexion 5R/5L, ankle dorsiflexion 5R/5L  Reflexes: biceps 2R/2L, triceps 2R/2L, brachioradialis 2R/2L, patellar 2R/2L, ankles 2R/2L.  Coordination: finger to nose without dysmetria or overshoot R/L.   Gait: deferred   Sensory: intact on R and L hemibody to light touch.     Labs  CBC Full  -  ( 06 Jul 2023 23:47 )  WBC Count : 5.33 K/uL  RBC Count : 5.28 M/uL  Hemoglobin : 15.0 g/dL  Hematocrit : 44.4 %  Platelet Count - Automated : 270 K/uL  Mean Cell Volume : 84.1 fL  Mean Cell Hemoglobin : 28.4 pg  Mean Cell Hemoglobin Concentration : 33.8 g/dL  Auto Neutrophil # : 2.52 K/uL  Auto Lymphocyte # : 2.14 K/uL  Auto Monocyte # : 0.48 K/uL  Auto Eosinophil # : 0.12 K/uL  Auto Basophil # : 0.05 K/uL  Auto Neutrophil % : 47.2 %  Auto Lymphocyte % : 40.2 %  Auto Monocyte % : 9.0 %  Auto Eosinophil % : 2.3 %  Auto Basophil % : 0.9 %    07-06    137  |  102  |  11  ----------------------------<  113<H>  3.9   |  23  |  0.8    Ca    9.9      06 Jul 2023 23:47  Phos  3.9     07-06  Mg     2.0     07-06      EEG: No data

## 2023-07-07 NOTE — ED ADULT NURSE REASSESSMENT NOTE - NS ED NURSE REASSESS COMMENT FT1
Received pt from previous RN A/O times 1  with his mother at bed site ,pt appears restless denies no pain no SOB no n/V no dizziness ambulate steady breakfast provide did eat 755 , safety precaution on progress MD on rounds made aware ,Abilify 7/7 ,g po order is given on going nursing observation .
Pt appears calm at this time. Pt moved to ED 3, report to SHARA Thornton.
Pt awake, standing, keep pacing inside room. As per mom, pt was having that eye gaze again and becoming agitated/restless while holding mom.  Dr Cantu made aware, okay to give Ativan as ordered. Was able to convinced pt to go back to bed.
Pt seen and evaluated by neuro CAROLYNE.

## 2023-07-08 VITALS
SYSTOLIC BLOOD PRESSURE: 158 MMHG | HEART RATE: 123 BPM | TEMPERATURE: 97 F | DIASTOLIC BLOOD PRESSURE: 83 MMHG | OXYGEN SATURATION: 97 % | RESPIRATION RATE: 20 BRPM

## 2023-07-08 PROCEDURE — 70450 CT HEAD/BRAIN W/O DYE: CPT | Mod: 26

## 2023-07-08 PROCEDURE — 99232 SBSQ HOSP IP/OBS MODERATE 35: CPT

## 2023-07-08 RX ORDER — ENOXAPARIN SODIUM 100 MG/ML
40 INJECTION SUBCUTANEOUS EVERY 12 HOURS
Refills: 0 | Status: DISCONTINUED | OUTPATIENT
Start: 2023-07-08 | End: 2023-07-09

## 2023-07-08 RX ADMIN — Medication 2 MILLIGRAM(S): at 12:38

## 2023-07-08 RX ADMIN — Medication 2 MILLIGRAM(S): at 15:36

## 2023-07-08 RX ADMIN — ARIPIPRAZOLE 7.5 MILLIGRAM(S): 15 TABLET ORAL at 11:02

## 2023-07-08 RX ADMIN — Medication 2 MILLIGRAM(S): at 22:40

## 2023-07-08 NOTE — CHART NOTE - NSCHARTNOTEFT_GEN_A_CORE
Called by RN as patient very agitated, several family members at bedside also asking me to intervene. Today's hospitalist note reviewed mentioning Ativan use for agitation. Will try this now

## 2023-07-08 NOTE — PROGRESS NOTE ADULT - ASSESSMENT
23-year-old male with past history of autism presents with mom for aggressive behavior. Mother endorses several months of worsening aggressive behavior and episodes of staring off into space into one direction and not acting himself. Patient was recently started on antipsychotic medication but mother has failed to see improvement and she believes he needs an EEG/seizure workup. Mother states she has been experiencing issues with scheduling her son with an appointment due to his autism.    Change in behavior  -Patient had appointment as outpatient for EEG, MR brain, mother says his behavior has been too difficult to control and she is unable to get testing done as outpatient  -Patient was transferred to  for VEEG  -CT head unremarkable  -Management per neurology (will likely need MR brain with sedation if EEG abnormal)  -Patient will likely need transfer back to [place] for management (MR brain with sedation)  -Neurology follow up awaited  -Ativan PRN for agitation, patient requires restraints to complete testing  -Continue 1:1

## 2023-07-08 NOTE — PROGRESS NOTE ADULT - SUBJECTIVE AND OBJECTIVE BOX
23-year-old male with past history of autism presents with mom for aggressive behavior. Mother endorses several months of worsening aggressive behavior and episodes of staring off into space into one direction and not acting himself. Patient was recently started on antipsychotic medication but mother has failed to see improvement and she believes he needs an EEG/seizure workup. Mother states she has been experiencing issues with scheduling her son with an appointment due to his autism.    Today:  Seen at bedside, patient's behavior is very difficult to control.  In order to get CT head, VEEG done, patient needed Ativan, b/l wrist restraints, mother understands and agrees with plan.              REVIEW OF SYSTEMS:  Not a reliable historian    MEDICATIONS  (STANDING):  ARIPiprazole 7.5 milliGRAM(s) Oral daily  enoxaparin Injectable 40 milliGRAM(s) SubCutaneous every 12 hours  LORazepam   Injectable 2 milliGRAM(s) IV Push once    MEDICATIONS  (PRN):  acetaminophen     Tablet .. 650 milliGRAM(s) Oral every 6 hours PRN Temp greater or equal to 38C (100.4F), Mild Pain (1 - 3)  aluminum hydroxide/magnesium hydroxide/simethicone Suspension 30 milliLiter(s) Oral every 4 hours PRN Dyspepsia  LORazepam   Injectable 2 milliGRAM(s) IV Push every 2 hours PRN seizure  melatonin 3 milliGRAM(s) Oral at bedtime PRN Insomnia  ondansetron Injectable 4 milliGRAM(s) IV Push every 8 hours PRN Nausea and/or Vomiting      Allergies  penicillin (Short breath; Rash)        FAMILY HISTORY:  No pertinent family history in first degree relatives        Vital Signs Last 24 Hrs  T(C): 36.1 (08 Jul 2023 05:00), Max: 37 (07 Jul 2023 18:26)  T(F): 96.9 (08 Jul 2023 05:00), Max: 98.6 (07 Jul 2023 18:26)  HR: 100 (08 Jul 2023 05:00) (89 - 113)  BP: 134/80 (08 Jul 2023 05:00) (131/75 - 136/84)  RR: 18 (08 Jul 2023 05:00) (18 - 18)  SpO2: 99% (08 Jul 2023 00:59) (99% - 99%)    Parameters below as of 07 Jul 2023 18:26  Patient On (Oxygen Delivery Method): room air        PHYSICAL EXAM:  GENERAL: agitated, cannot be re-directed  EYES: EOMI, conjunctiva and sclera clear  ENMT: No tonsillar erythema, exudates, or enlargement; Moist mucous membranes, Good dentition, No lesions  NECK: Supple, No JVD, Normal thyroid  NERVOUS SYSTEM:  Awake, cannot participate in exam  CHEST/LUNG: CTA bilaterally; No rales, rhonchi, wheezing, or rubs  HEART: Regular rate and rhythm; No murmurs, rubs, or gallops  ABDOMEN: Soft, Nontender, Nondistended; Bowel sounds present  EXTREMITIES:  2+ Peripheral Pulses, No clubbing, cyanosis, or edema  SKIN: No rashes or lesions      LABS:                        14.8   4.57  )-----------( 233      ( 07 Jul 2023 07:55 )             45.0     07-07    139  |  104  |  11  ----------------------------<  98  4.6   |  25  |  0.8    Ca    9.6      07 Jul 2023 07:55  Phos  3.9     07-06  Mg     2.0     07-06    TPro  6.5  /  Alb  4.1  /  TBili  0.2  /  DBili  x   /  AST  20  /  ALT  34  /  AlkPhos  44  07-07      Urinalysis Basic - ( 07 Jul 2023 07:55 )    Color: x / Appearance: x / SG: x / pH: x  Gluc: 98 mg/dL / Ketone: x  / Bili: x / Urobili: x   Blood: x / Protein: x / Nitrite: x   Leuk Esterase: x / RBC: x / WBC x   Sq Epi: x / Non Sq Epi: x / Bacteria: x

## 2023-07-08 NOTE — PATIENT PROFILE ADULT - FALL HARM RISK - UNIVERSAL INTERVENTIONS
Bed in lowest position, wheels locked, appropriate side rails in place/Call bell, personal items and telephone in reach/Instruct patient to call for assistance before getting out of bed or chair/Non-slip footwear when patient is out of bed/[place] to call system/Physically safe environment - no spills, clutter or unnecessary equipment/Purposeful Proactive Rounding/Room/bathroom lighting operational, light cord in reach

## 2023-07-09 ENCOUNTER — TRANSCRIPTION ENCOUNTER (OUTPATIENT)
Age: 23
End: 2023-07-09

## 2023-07-09 LAB
ALBUMIN SERPL ELPH-MCNC: 4.5 G/DL — SIGNIFICANT CHANGE UP (ref 3.5–5.2)
ALP SERPL-CCNC: 46 U/L — SIGNIFICANT CHANGE UP (ref 30–115)
ALT FLD-CCNC: 38 U/L — SIGNIFICANT CHANGE UP (ref 0–41)
ANION GAP SERPL CALC-SCNC: 14 MMOL/L — SIGNIFICANT CHANGE UP (ref 7–14)
AST SERPL-CCNC: 27 U/L — SIGNIFICANT CHANGE UP (ref 0–41)
BASOPHILS # BLD AUTO: 0.04 K/UL — SIGNIFICANT CHANGE UP (ref 0–0.2)
BASOPHILS NFR BLD AUTO: 0.8 % — SIGNIFICANT CHANGE UP (ref 0–1)
BILIRUB SERPL-MCNC: 0.4 MG/DL — SIGNIFICANT CHANGE UP (ref 0.2–1.2)
BUN SERPL-MCNC: 13 MG/DL — SIGNIFICANT CHANGE UP (ref 10–20)
CALCIUM SERPL-MCNC: 9.7 MG/DL — SIGNIFICANT CHANGE UP (ref 8.4–10.5)
CHLORIDE SERPL-SCNC: 103 MMOL/L — SIGNIFICANT CHANGE UP (ref 98–110)
CO2 SERPL-SCNC: 23 MMOL/L — SIGNIFICANT CHANGE UP (ref 17–32)
CREAT SERPL-MCNC: 0.9 MG/DL — SIGNIFICANT CHANGE UP (ref 0.7–1.5)
EGFR: 124 ML/MIN/1.73M2 — SIGNIFICANT CHANGE UP
EOSINOPHIL # BLD AUTO: 0.04 K/UL — SIGNIFICANT CHANGE UP (ref 0–0.7)
EOSINOPHIL NFR BLD AUTO: 0.8 % — SIGNIFICANT CHANGE UP (ref 0–8)
GLUCOSE SERPL-MCNC: 102 MG/DL — HIGH (ref 70–99)
HCT VFR BLD CALC: 46.6 % — SIGNIFICANT CHANGE UP (ref 42–52)
HGB BLD-MCNC: 15.4 G/DL — SIGNIFICANT CHANGE UP (ref 14–18)
IMM GRANULOCYTES NFR BLD AUTO: 0.4 % — HIGH (ref 0.1–0.3)
LYMPHOCYTES # BLD AUTO: 1.86 K/UL — SIGNIFICANT CHANGE UP (ref 1.2–3.4)
LYMPHOCYTES # BLD AUTO: 36.7 % — SIGNIFICANT CHANGE UP (ref 20.5–51.1)
MCHC RBC-ENTMCNC: 27.7 PG — SIGNIFICANT CHANGE UP (ref 27–31)
MCHC RBC-ENTMCNC: 33 G/DL — SIGNIFICANT CHANGE UP (ref 32–37)
MCV RBC AUTO: 84 FL — SIGNIFICANT CHANGE UP (ref 80–94)
MONOCYTES # BLD AUTO: 0.4 K/UL — SIGNIFICANT CHANGE UP (ref 0.1–0.6)
MONOCYTES NFR BLD AUTO: 7.9 % — SIGNIFICANT CHANGE UP (ref 1.7–9.3)
NEUTROPHILS # BLD AUTO: 2.71 K/UL — SIGNIFICANT CHANGE UP (ref 1.4–6.5)
NEUTROPHILS NFR BLD AUTO: 53.4 % — SIGNIFICANT CHANGE UP (ref 42.2–75.2)
NRBC # BLD: 0 /100 WBCS — SIGNIFICANT CHANGE UP (ref 0–0)
PLATELET # BLD AUTO: 243 K/UL — SIGNIFICANT CHANGE UP (ref 130–400)
PMV BLD: 10.1 FL — SIGNIFICANT CHANGE UP (ref 7.4–10.4)
POTASSIUM SERPL-MCNC: 4.4 MMOL/L — SIGNIFICANT CHANGE UP (ref 3.5–5)
POTASSIUM SERPL-SCNC: 4.4 MMOL/L — SIGNIFICANT CHANGE UP (ref 3.5–5)
PROT SERPL-MCNC: 7.2 G/DL — SIGNIFICANT CHANGE UP (ref 6–8)
RBC # BLD: 5.55 M/UL — SIGNIFICANT CHANGE UP (ref 4.7–6.1)
RBC # FLD: 13 % — SIGNIFICANT CHANGE UP (ref 11.5–14.5)
SODIUM SERPL-SCNC: 140 MMOL/L — SIGNIFICANT CHANGE UP (ref 135–146)
WBC # BLD: 5.07 K/UL — SIGNIFICANT CHANGE UP (ref 4.8–10.8)
WBC # FLD AUTO: 5.07 K/UL — SIGNIFICANT CHANGE UP (ref 4.8–10.8)

## 2023-07-09 PROCEDURE — 99221 1ST HOSP IP/OBS SF/LOW 40: CPT

## 2023-07-09 PROCEDURE — 99239 HOSP IP/OBS DSCHRG MGMT >30: CPT

## 2023-07-09 PROCEDURE — 95720 EEG PHY/QHP EA INCR W/VEEG: CPT

## 2023-07-09 RX ORDER — ARIPIPRAZOLE 15 MG/1
0.5 TABLET ORAL
Qty: 15 | Refills: 0
Start: 2023-07-09 | End: 2023-08-07

## 2023-07-09 RX ADMIN — ARIPIPRAZOLE 7.5 MILLIGRAM(S): 15 TABLET ORAL at 11:17

## 2023-07-09 NOTE — DISCHARGE NOTE PROVIDER - CARE PROVIDER_API CALL
Saran Alexander  Neurology  [address] [place] [address], [address] [address]  Phone: [phone]  Fax: [phone]  Follow Up Time:

## 2023-07-09 NOTE — DISCHARGE NOTE PROVIDER - HOSPITAL COURSE
23-year-old male with past history of autism presents with mom for aggressive behavior. Mother endorses several months of worsening aggressive behavior and episodes of staring off into space into one direction and not acting himself. Patient was recently started on antipsychotic medication but mother has failed to see improvement and she believes he needs an EEG/seizure workup. Mother states she has been experiencing issues with scheduling her son with an appointment due to his autism.    Change in behavior  -Patient had appointment as outpatient for EEG, MR brain, mother says his behavior has been too difficult to control and she is unable to get testing done as outpatient  -Patient was transferred to  for VEEG S/P VEEG, cleared by Dr MCQUEEN  -CT head unremarkable  -continue home meds

## 2023-07-09 NOTE — CONSULT NOTE ADULT - ASSESSMENT
23 year old with history of Autism admitted for management altered mental status. Acute onset and stereotyped nature of the events reported could be indicative of seizure activity. I discussed with family that change in behavior triggered by routine change in new Day Program could also be etiology particularly in persons on the Spectrum. EEG overnight normal but did not capture episode.    1. Recommend continuing VEEG in order to capture episode. Family does not wish to continue EEG longer than this afternoon. As such, if normal over next few hours will discontinue VEEG  2. Psychiatry consult recommended for continued management of his behaviors

## 2023-07-09 NOTE — DISCHARGE NOTE NURSING/CASE MANAGEMENT/SOCIAL WORK - PATIENT PORTAL LINK FT
You can access the FollowMyHealth Patient Portal offered by [facility] by registering at the following website: [email or web address]. By joining [employer]’s FollowMyHealth portal, you will also be able to view your health information using other applications (apps) compatible with our system.

## 2023-07-09 NOTE — DISCHARGE NOTE PROVIDER - NSDCCPCAREPLAN_GEN_ALL_CORE_FT
PRINCIPAL DISCHARGE DIAGNOSIS  Diagnosis: Altered mental state  Assessment and Plan of Treatment: VEEG clean, cleared by neurology, can follow-up outpatient as needed   Continue your medications as prescribed

## 2023-07-09 NOTE — DISCHARGE NOTE PROVIDER - ATTENDING DISCHARGE PHYSICAL EXAMINATION:
GENERAL: MOre calm today   EYES: EOMI, conjunctiva and sclera clear  ENMT: No tonsillar erythema, exudates, or enlargement; Moist mucous membranes, Good dentition, No lesions  NECK: Supple, No JVD, Normal thyroid  NERVOUS SYSTEM:  Awake, cannot participate in exam  CHEST/LUNG: CTA bilaterally; No rales, rhonchi, wheezing, or rubs  HEART: Regular rate and rhythm; No murmurs, rubs, or gallops  ABDOMEN: Soft, Nontender, Nondistended; Bowel sounds present  EXTREMITIES:  2+ Peripheral Pulses, No clubbing, cyanosis, or edema  SKIN: No rashes or lesions

## 2023-07-09 NOTE — DISCHARGE NOTE NURSING/CASE MANAGEMENT/SOCIAL WORK - NSDCPEFALRISK_GEN_ALL_CORE
For information on Fall & Injury Prevention, visit: [email or web address] OR  [email or web address] OR  https://www.cdc.gov/steadi/patient.html

## 2023-07-09 NOTE — EEG REPORT - NS EEG TEXT BOX
[place] Department of Neurology  Epilepsy Monitoring Unit video-EEG Report      Patient Name:	SANDRA MORALES    :	2000  MRN:	-  Study Date/Time:	2023, 1:42:52 PM  Referred by:	-    Brief Clinical History:  SANDRA MORALES is a 23 year old Male; study performed to investigate for seizures or markers of epilepsy.   Diagnosis Code:  R40.4 Transient alteration of awareness    Patient Medication:  No Data.    Acquisition Details:  Electroencephalography was acquired using a minimum of 21 channels on an [employer] system v 9.3.1 with electrode placement according to the standard International 10-20 system following ACNS (American Clinical Neurophysiology Society) guidelines for Long-Term Video EEG monitoring.  Anterior temporal T1 and T2 electrodes were utilized whenever possible.   The XLTEK automated spike & seizure detections were all reviewed in detail, in addition to extensive portions of raw EEG.  The live video was monitored continuously by trained technicians to identify events and specialty nurses trained in seizure management supervised the care of the patient in the epilepsy unit.    Day1: 2023 @ 1:42:52 PM to next morning @ 07:00 am  Background:  continuous.   Symmetry:  Symmetric  Posterior Dominant Rhythm:  9 Hz symmetric, well-organized, and well-modulated  Organization:  Appropriate anterior-posterior gradient  Voltage:  Normal (20uV)  Variability:  Yes	Reactivity:  Yes  Sleep:  Symmetric, synchronous spindles and K complexes.  Focal abnormalities:  No persistent asymmetries of voltage or frequency.  Interictal Activity:  None  Focal Slowing:  None  Generalized Slowing:  No  Events: No electrographic seizures or significant clinical events.  Provocations: Hyperventilation and Photic stimulation:  was not performed.  Daily Impression:  Normal   No epileptiform activity and no significant clinical events occurred.        FINAL Impression:  Normal VEEG    Final Clinical Correlation:  Normal study does not exclude diagnosis of seizure disorder      Amy Curran MD  Attending Neurologist, Division of Epilepsy

## 2023-07-09 NOTE — CONSULT NOTE ADULT - SUBJECTIVE AND OBJECTIVE BOX
DARRYN MORALES  [id number]  23yMale      acetaminophen     Tablet .. 650 milliGRAM(s) Oral every 6 hours PRN  aluminum hydroxide/magnesium hydroxide/simethicone Suspension 30 milliLiter(s) Oral every 4 hours PRN  ARIPiprazole 7.5 milliGRAM(s) Oral daily  enoxaparin Injectable 40 milliGRAM(s) SubCutaneous every 12 hours  LORazepam   Injectable 2 milliGRAM(s) IV Push every 2 hours PRN  melatonin 3 milliGRAM(s) Oral at bedtime PRN  ondansetron Injectable 4 milliGRAM(s) IV Push every 8 hours PRN    penicillin (Short breath; Rash)        HPI:  23 year old known history Autism admitted for management recent change in behavior. Mother and brother state that early June Darryn began having episodes described as altered. He would become diaphoretic and have "change" in countenance. He then takes phone and stares at it closely alternating with making his mother or whoever is near him look at the phone while repeating one word. After some seconds of this he typically grabs at the clothing of the person near him and becomes combative for some seconds to minutes. It is unclear if he was immediately back to his baseline afterwards. These episodes are occurring a few times per week. Yesterday episode appeared particularly violent prompting his being brought back to the ER. In the past he was evaluated by [facility]iatry at [facility] and was started on Aripiprazole. Family states Darryn does not have a history of aggressive behaviors prior to June. There was a change in his routine in that he is now participating in a Day Program. Mom notes that since starting this program Darryn appears hesitant to board the bus to the program.       ROS: Recently poor sleeping habits. Afebrile. No recent illnesses. No respiratory distress. No palpitations. No change in diet. Normal urine and stool. No rashes/lesions    FHx: Noncontributory    Exam:    Awake and alert. In NAD. Nonverbal.    CN II-XII in tact. No nystagmus.    Motor: Full strength x 4. Normal tone. Ambulates without assistance.    Sensory- Normal to all primary and secondary modalities      VEEG so far normal. Full report in chart

## 2023-07-09 NOTE — DISCHARGE NOTE PROVIDER - NSDCFUSCHEDAPPT_GEN_ALL_CORE_FT
Behuria, Supreeti  [facility]  CARDIOLOGY [address] [facility]  Scheduled Appointment: 09/18/2023     NS flush

## 2023-07-09 NOTE — DISCHARGE NOTE PROVIDER - NSDCMRMEDTOKEN_GEN_ALL_CORE_FT
Alivio 400 mg oral tablet: 1 tab(s) orally every 6 hours, As Needed -for fever   ARIPiprazole 15 mg oral tablet: 0.5 tab(s) orally once a day Take half pill  Tylenol Regular Strength 325 mg oral tablet: 2 tab(s) orally every 6 hours, As Needed -for fever

## 2023-07-19 NOTE — CDI QUERY NOTE - NSCDIOTHERTXTBX_GEN_ALL_CORE_HH
DOCUMENTATION CLARIFICATION FORM   Encounter #: [id number]                                 Patient’s Name: Darryn Degroot  Medical Record #: [id number]                              Admit Date: 2023  : 2000                                                   Discharged: 2023  CDI Specialist/: Hayley                              Contact #: [phone]    Dear Dr. Novak,                    Date: 2023  The Physician’s or Provider’s documentation of the patient’s presentation, evaluation and  medical management, as identified below, may support a diagnosis that is not documented in the medical record.  In order to accurately capture all diagnoses to the greatest degree of specificity reflecting the patient’s actual severity of illness, the documentation in this patient’s medical record requires additional clarification.  Please include more specific documentation, either known or suspected, of a corresponding diagnosis associated with the clinical information described below in your Progress Note and/or Discharge Summary.    QUERY  Based on your professional judgment and the clinical indicators, please clarify altered mental status can be further specified as:  •	Altered mental status suspected to be multifactorial associated with possible seizures and autism with aggressive behavior could not be excluded at the time of discharge  •	Altered mental status suspected to be associated with possible seizures that could not be excluded at the time of discharge  •	Altered mental status suspected to be associated with autism with aggressive behavior that could not be excluded at the time of discharge  •	Other (please specify) ______________  •	Clinically unable to further specify altered mental status    CLINICAL INDICATORS  Documentation  •	- Neurology Consult…23y Male w/ Autism and Vitamin B12 insufficiency brought in by family for sudden changes in behavior and unilateral eye gaze…Was started recently on abilify 5mg increased to 7.5mg and thorazine given for aggressive behaviors…Also was told to use 75mg Benadryl…Has had no further episodes since admission…Plan…VEEG…May need MRI brain under sedation if VEEG shows abnormalities  •	 7-7 H&P…Increasing agitation + staring episodes, rule out seizure disorder…Ativan PRN.  •	7-8 Medicine…  behavior is very difficult to control.  In order to get CT head, VEEG done, patient needed Ativan, b/l wrist restraints…agitated, cannot be re-directed…recently started on antipsychotic medication but mother has failed to see improvement…-CT head unremarkable  •	7-8 Medicine… very agitated, several family members at bedside also asking me to intervene. Today's hospitalist note reviewed mentioning Ativan use for agitation. Will try this now.  •	7-9 Epilepsy Consult… does not have a history of aggressive behaviors prior to . There was a change in his routine in that he is now participating in a Day Program…Mom notes that since starting this… appears hesitant to board the bus… Recently poor sleeping habits…Recommend continuing VEEG in order to capture episode. Family does not wish to continue EEG longer than this afternoon. As such, if normal over next few hours will discontinue VEEG…Psychiatry consult recommended for continued management of his behaviors  •	7-9 DC Summary…Diagnosis: Altered mental state…VEEG clean, cleared by neurology, can follow-up outpatient    Documentation clarification is required for compliance, accuracy in coding and billing, and reporting severity of illness, quality data   and risk of mortality.  --------------------------------------------------------------------------------------------------------------------------------------------  DO NOT REMOVE THIS RECORD WITHOUT FIRST NOTIFYING THE CDI SPECIALIST  This form is NOT a part of the permanent Medical Record. DOCUMENTATION CLARIFICATION FORM   Encounter #: [id number]                                 Patient’s Name: Darryn Degroot  Medical Record #: [id number]                              Admit Date: 2023  : 2000                                                   Discharged: 2023  CDI Specialist/: Hayley                              Contact #: [phone]    Dear Dr. Woody,                    Date: 2023  The Physician’s or Provider’s documentation of the patient’s presentation, evaluation and  medical management, as identified below, may support a diagnosis that is not documented in the medical record.  In order to accurately capture all diagnoses to the greatest degree of specificity reflecting the patient’s actual severity of illness, the documentation in this patient’s medical record requires additional clarification.  Please include more specific documentation, either known or suspected, of a corresponding diagnosis associated with the clinical information described below in your Progress Note and/or Discharge Summary.    QUERY  Based on your professional judgment and the clinical indicators, please clarify altered mental status can be further specified as:  •	Altered mental status suspected to be multifactorial associated with possible seizures and autism with aggressive behavior could not be excluded   •	Altered mental status suspected to be associated with possible seizures that could not be excluded   •	Altered mental status suspected to be associated with autism with aggressive behavior that could not be excluded   •	Other (please specify) ______________  •	Clinically unable to further specify altered mental status    CLINICAL INDICATORS  Documentation  •	7-7 Neurology Consult…23y Male w/ Autism and Vitamin B12 insufficiency brought in by family for sudden changes in behavior and unilateral eye gaze…Was started recently on abilify 5mg increased to 7.5mg and thorazine given for aggressive behaviors…Also was told to use 75mg Benadryl…Has had no further episodes since admission…Plan…VEEG…May need MRI brain under sedation if VEEG shows abnormalities  •	 7-7 H&P…Increasing agitation + staring episodes, rule out seizure disorder…Ativan PRN.  •	7-8 Medicine…  behavior is very difficult to control.  In order to get CT head, VEEG done, patient needed Ativan, b/l wrist restraints…agitated, cannot be re-directed…recently started on antipsychotic medication but mother has failed to see improvement…-CT head unremarkable  •	7-8 Medicine… very agitated, several family members at bedside also asking me to intervene. Today's hospitalist note reviewed mentioning Ativan use for agitation. Will try this now.  •	7-9 Epilepsy Consult… does not have a history of aggressive behaviors prior to . There was a change in his routine in that he is now participating in a Day Program…Mom notes that since starting this… appears hesitant to board the bus… Recently poor sleeping habits…Recommend continuing VEEG in order to capture episode. Family does not wish to continue EEG longer than this afternoon. As such, if normal over next few hours will discontinue VEEG…Psychiatry consult recommended for continued management of his behaviors  •	7-9 DC Summary…Diagnosis: Altered mental state…VEEG clean, cleared by neurology, can follow-up outpatient    Documentation clarification is required for compliance, accuracy in coding and billing, and reporting severity of illness, quality data   and risk of mortality.  --------------------------------------------------------------------------------------------------------------------------------------------  DO NOT REMOVE THIS RECORD WITHOUT FIRST NOTIFYING THE CDI SPECIALIST  This form is NOT a part of the permanent Medical Record. DOCUMENTATION CLARIFICATION FORM   Encounter #: [id number]                                 Patient’s Name: Darryn Degroot  Medical Record #: [id number]                              Admit Date: 2023  : 2000                                                   Discharged: 2023  CDI Specialist/: Hayley                              Contact #: [phone]    Dear Dr. Novak,                    Date: 2023  The Physician’s or Provider’s documentation of the patient’s presentation, evaluation and  medical management, as identified below, may support a diagnosis that is not documented in the medical record.  In order to accurately capture all diagnoses to the greatest degree of specificity reflecting the patient’s actual severity of illness, the documentation in this patient’s medical record requires additional clarification.  Please include more specific documentation, either known or suspected, of a corresponding diagnosis associated with the clinical information described below in your Progress Note and/or Discharge Summary.    QUERY  Based on your professional judgment and the clinical indicators, please clarify altered mental status can be further specified as:  •	Altered mental status suspected to be multifactorial associated with possible seizures and autism with aggressive behavior could not be excluded at the time  of discharge  •	Altered mental status suspected to be associated with possible seizures that could not be excluded at the time  of discharge  •	Altered mental status suspected to be associated with autism with aggressive behavior that could not be excluded at the time  of discharge  •	Other (please specify) ______________  •	Clinically unable to further specify altered mental status    CLINICAL INDICATORS  Documentation  •	- Neurology Consult…23y Male w/ Autism and Vitamin B12 insufficiency brought in by family for sudden changes in behavior and unilateral eye gaze…Was started recently on abilify 5mg increased to 7.5mg and thorazine given for aggressive behaviors…Also was told to use 75mg Benadryl…Has had no further episodes since admission…Plan…VEEG…May need MRI brain under sedation if VEEG shows abnormalities  •	 7-7 H&P…Increasing agitation + staring episodes, rule out seizure disorder…Ativan PRN.  •	7-8 Medicine…  behavior is very difficult to control.  In order to get CT head, VEEG done, patient needed Ativan, b/l wrist restraints…agitated, cannot be re-directed…recently started on antipsychotic medication but mother has failed to see improvement…-CT head unremarkable  •	7-8 Medicine… very agitated, several family members at bedside also asking me to intervene. Today's hospitalist note reviewed mentioning Ativan use for agitation. Will try this now.  •	7-9 Epilepsy Consult… does not have a history of aggressive behaviors prior to . There was a change in his routine in that he is now participating in a Day Program…Mom notes that since starting this… appears hesitant to board the bus… Recently poor sleeping habits…Recommend continuing VEEG in order to capture episode. Family does not wish to continue EEG longer than this afternoon. As such, if normal over next few hours will discontinue VEEG…Psychiatry consult recommended for continued management of his behaviors  •	7-9 DC Summary…Diagnosis: Altered mental state…VEEG clean, cleared by neurology, can follow-up outpatient    Documentation clarification is required for compliance, accuracy in coding and billing, and reporting severity of illness, quality data   and risk of mortality.  --------------------------------------------------------------------------------------------------------------------------------------------  DO NOT REMOVE THIS RECORD WITHOUT FIRST NOTIFYING THE CDI SPECIALIST  This form is NOT a part of the permanent Medical Record.

## 2023-07-21 ENCOUNTER — NON-APPOINTMENT (OUTPATIENT)
Age: 23
End: 2023-07-21

## 2023-09-18 ENCOUNTER — APPOINTMENT (OUTPATIENT)
Dept: CARDIOLOGY | Facility: CLINIC | Age: 23
End: 2023-09-18
Payer: MEDICAID

## 2023-09-18 VITALS
OXYGEN SATURATION: 98 % | TEMPERATURE: 97.8 F | SYSTOLIC BLOOD PRESSURE: 118 MMHG | HEIGHT: 71 IN | BODY MASS INDEX: 34.02 KG/M2 | DIASTOLIC BLOOD PRESSURE: 76 MMHG | WEIGHT: 243 LBS | HEART RATE: 91 BPM

## 2023-09-18 PROCEDURE — 93000 ELECTROCARDIOGRAM COMPLETE: CPT

## 2023-09-18 PROCEDURE — 99213 OFFICE O/P EST LOW 20 MIN: CPT

## 2023-09-18 RX ORDER — ARIPIPRAZOLE 15 MG/1
15 TABLET ORAL
Refills: 0 | Status: ACTIVE | COMMUNITY

## 2023-09-18 RX ORDER — AMLODIPINE BESYLATE 2.5 MG/1
2.5 TABLET ORAL DAILY
Qty: 30 | Refills: 2 | Status: DISCONTINUED | COMMUNITY
Start: 2023-04-12 | End: 2023-09-18

## 2023-11-19 ENCOUNTER — NON-APPOINTMENT (OUTPATIENT)
Age: 23
End: 2023-11-19

## 2023-12-18 ENCOUNTER — NON-APPOINTMENT (OUTPATIENT)
Age: 23
End: 2023-12-18

## 2023-12-18 ENCOUNTER — APPOINTMENT (OUTPATIENT)
Dept: CARDIOLOGY | Facility: CLINIC | Age: 23
End: 2023-12-18
Payer: MEDICAID

## 2023-12-18 VITALS
OXYGEN SATURATION: 97 % | WEIGHT: 230 LBS | SYSTOLIC BLOOD PRESSURE: 106 MMHG | BODY MASS INDEX: 32.2 KG/M2 | DIASTOLIC BLOOD PRESSURE: 78 MMHG | RESPIRATION RATE: 20 BRPM | HEIGHT: 71 IN | HEART RATE: 79 BPM | TEMPERATURE: 97.9 F

## 2023-12-18 LAB
ALBUMIN SERPL ELPH-MCNC: 4.6 G/DL
ALP BLD-CCNC: 43 U/L
ALT SERPL-CCNC: 24 U/L
ANION GAP SERPL CALC-SCNC: 12 MMOL/L
AST SERPL-CCNC: 20 U/L
BILIRUB SERPL-MCNC: 0.5 MG/DL
BUN SERPL-MCNC: 12 MG/DL
CALCIUM SERPL-MCNC: 9.7 MG/DL
CHLORIDE SERPL-SCNC: 105 MMOL/L
CHOLEST SERPL-MCNC: 187 MG/DL
CO2 SERPL-SCNC: 25 MMOL/L
CREAT SERPL-MCNC: 1 MG/DL
EGFR: 109 ML/MIN/1.73M2
ESTIMATED AVERAGE GLUCOSE: 103 MG/DL
GLUCOSE SERPL-MCNC: 92 MG/DL
HBA1C MFR BLD HPLC: 5.2 %
HCT VFR BLD CALC: 46.4 %
HDLC SERPL-MCNC: 33 MG/DL
HGB BLD-MCNC: 15.4 G/DL
LDLC SERPL CALC-MCNC: 125 MG/DL
MCHC RBC-ENTMCNC: 28.8 PG
MCHC RBC-ENTMCNC: 33.2 G/DL
MCV RBC AUTO: 86.9 FL
NONHDLC SERPL-MCNC: 154 MG/DL
PLATELET # BLD AUTO: 195 K/UL
PMV BLD: 11.3 FL
POTASSIUM SERPL-SCNC: 4.5 MMOL/L
PROT SERPL-MCNC: 7.2 G/DL
RBC # BLD: 5.34 M/UL
RBC # FLD: 13 %
SODIUM SERPL-SCNC: 142 MMOL/L
TRIGL SERPL-MCNC: 144 MG/DL
TSH SERPL-ACNC: 1.87 UIU/ML
WBC # FLD AUTO: 2.88 K/UL

## 2023-12-18 PROCEDURE — 99213 OFFICE O/P EST LOW 20 MIN: CPT

## 2023-12-18 PROCEDURE — 93000 ELECTROCARDIOGRAM COMPLETE: CPT

## 2023-12-18 NOTE — CARDIOLOGY SUMMARY
[de-identified] : 12/18/23: normal sinus rhythm, nonspecific ST and T wave changes 9/18/23: normal sinus rhythm, nonspecific ST and T wave changes 6/12/23: sinus rhythm, nonspecific ST and T wave changes  [de-identified] : 3/1/23: Normal left and right ventricular size and systolic function. No significant valve disease

## 2023-12-18 NOTE — ASSESSMENT
[FreeTextEntry1] : 23 year old man with Autism, HLD and HTN who presents for follow up today.  1. HTN: BP at goal at home and in the office. Continue to monitor.  3. HLD: Check lipid panel and CMP today.  The primary prevention of heart disease was discussed in detail with the patient, including adhering to a heart healthy, plant based, or Mediterranean diet, and the importance of 30 minutes of moderate intensity activity for 30 minutes, 5 times a week. All the patient's questions were answered.   RTC in 3 months.

## 2023-12-18 NOTE — HISTORY OF PRESENT ILLNESS
[FreeTextEntry1] : SANDRA MORALES is a 23 year old man with Autism, HLD and HTN who presents for follow up today.   He is here with his mom today. His SBP is in the 120s at home. The patient is unable to describe pain. She says she does not notice him clutching his chest or short of breath. She has not noticed any leg swelling. He has not had any syncopal episodes.   He goes to the gym six days a week for an hour. They have been portion controlling his diet and he has lost 13 pounds in 3 months!

## 2024-01-02 ENCOUNTER — RX RENEWAL (OUTPATIENT)
Age: 24
End: 2024-01-02

## 2024-01-05 ENCOUNTER — APPOINTMENT (OUTPATIENT)
Dept: OTOLARYNGOLOGY | Facility: CLINIC | Age: 24
End: 2024-01-05
Payer: MEDICAID

## 2024-01-05 PROCEDURE — 92582 CONDITIONING PLAY AUDIOMETRY: CPT

## 2024-01-05 PROCEDURE — 92588 EVOKED AUDITORY TST COMPLETE: CPT

## 2024-01-05 PROCEDURE — 99214 OFFICE O/P EST MOD 30 MIN: CPT | Mod: 25

## 2024-01-05 PROCEDURE — G0268 REMOVAL OF IMPACTED WAX MD: CPT

## 2024-01-05 PROCEDURE — 92550 TYMPANOMETRY & REFLEX THRESH: CPT

## 2024-01-05 PROCEDURE — 92556 SPEECH AUDIOMETRY COMPLETE: CPT

## 2024-01-05 NOTE — ASSESSMENT
[FreeTextEntry1] : Wax found and cleaned. Cleaning well tolerated by patient. Patient felt improvement in cloginess after cleaning.  I reviewed, interpreted, and discussed the Audiogram done today. Bilateral mild SNHL.   History and discussion with patient's mom.

## 2024-01-05 NOTE — HISTORY OF PRESENT ILLNESS
[FreeTextEntry1] : Patient returns today c/o allergic rhinitis and clogged ears.  He is accompanied by his mother.  His programed asked for an audiogram. Mom noticed that spring and Fall , allergies act up.  Mom has been using azelastine as needed.

## 2024-01-05 NOTE — REASON FOR VISIT
[Subsequent Evaluation] : a subsequent evaluation for [FreeTextEntry2] : allergic rhinitis and clogged ears

## 2024-01-06 ENCOUNTER — APPOINTMENT (OUTPATIENT)
Dept: INTERNAL MEDICINE | Facility: CLINIC | Age: 24
End: 2024-01-06

## 2024-01-06 ENCOUNTER — OUTPATIENT (OUTPATIENT)
Dept: OUTPATIENT SERVICES | Facility: HOSPITAL | Age: 24
LOS: 1 days | End: 2024-01-06
Payer: MEDICAID

## 2024-01-06 VITALS
BODY MASS INDEX: 32.76 KG/M2 | TEMPERATURE: 98 F | WEIGHT: 234 LBS | SYSTOLIC BLOOD PRESSURE: 125 MMHG | HEART RATE: 89 BPM | HEIGHT: 71 IN | DIASTOLIC BLOOD PRESSURE: 85 MMHG | OXYGEN SATURATION: 100 %

## 2024-01-06 DIAGNOSIS — Z00.00 ENCOUNTER FOR GENERAL ADULT MEDICAL EXAMINATION WITHOUT ABNORMAL FINDINGS: ICD-10-CM

## 2024-01-06 PROCEDURE — 99204 OFFICE O/P NEW MOD 45 MIN: CPT

## 2024-01-06 NOTE — HISTORY OF PRESENT ILLNESS
[Family Member] : family member [FreeTextEntry1] : New Patient. Hx of Autism for many years. Brought here by his Brother. Under catre of his Psychiatrist and taking Abilify for his Behavior. Also Hx of Neutropenia and seeing a Hematologist and taking B12 injections and Brother says he feels better. Also had mild elevation of Cholesterol and BP and seeing a Cardiologist here and niot on any meds, Recently had all Blood tests done in [place]. He will bring all Blood test results soon.

## 2024-01-06 NOTE — ASSESSMENT
[FreeTextEntry1] : # 1 Autism     Under care of Psychiatrist      Continue Abilify #2  Congenital Neutropenia       Continue B12        Continue F/U by Hematologist #3 Hx of HTN      F/U by Cardiology.        No Rx #4  Hx of HLD       F/U by Cardiology  #5  HCM       Blood tests done last week  in [place]       Brother will bring results

## 2024-01-06 NOTE — REVIEW OF SYSTEMS
[Negative] : Integumentary [FreeTextEntry4] : Allergies [de-identified] : Autism [de-identified] : Autism.  [de-identified] : Neutropenia

## 2024-01-10 DIAGNOSIS — F84.0 AUTISTIC DISORDER: ICD-10-CM

## 2024-01-10 DIAGNOSIS — Z00.00 ENCOUNTER FOR GENERAL ADULT MEDICAL EXAMINATION WITHOUT ABNORMAL FINDINGS: ICD-10-CM

## 2024-01-10 DIAGNOSIS — I10 ESSENTIAL (PRIMARY) HYPERTENSION: ICD-10-CM

## 2024-01-10 DIAGNOSIS — D70.9 NEUTROPENIA, UNSPECIFIED: ICD-10-CM

## 2024-01-29 ENCOUNTER — NON-APPOINTMENT (OUTPATIENT)
Age: 24
End: 2024-01-29

## 2024-03-18 ENCOUNTER — RESULT CHARGE (OUTPATIENT)
Age: 24
End: 2024-03-18

## 2024-03-18 ENCOUNTER — APPOINTMENT (OUTPATIENT)
Dept: CARDIOLOGY | Facility: CLINIC | Age: 24
End: 2024-03-18
Payer: MEDICAID

## 2024-03-18 VITALS
WEIGHT: 237 LBS | OXYGEN SATURATION: 98 % | HEIGHT: 71 IN | RESPIRATION RATE: 18 BRPM | BODY MASS INDEX: 33.18 KG/M2 | DIASTOLIC BLOOD PRESSURE: 82 MMHG | SYSTOLIC BLOOD PRESSURE: 123 MMHG | HEART RATE: 85 BPM

## 2024-03-18 PROCEDURE — 99213 OFFICE O/P EST LOW 20 MIN: CPT

## 2024-03-18 PROCEDURE — 93000 ELECTROCARDIOGRAM COMPLETE: CPT

## 2024-03-18 PROCEDURE — G2211 COMPLEX E/M VISIT ADD ON: CPT | Mod: NC,1L

## 2024-03-18 NOTE — CARDIOLOGY SUMMARY
[de-identified] : 3/18/24: normal sinus rhythm, nonspecific ST and T wave changes 12/18/23: normal sinus rhythm, nonspecific ST and T wave changes 9/18/23: normal sinus rhythm, nonspecific ST and T wave changes 6/12/23: sinus rhythm, nonspecific ST and T wave changes  [de-identified] : 3/1/23: Normal left and right ventricular size and systolic function. No significant valve disease

## 2024-03-18 NOTE — PHYSICAL EXAM
[Well Developed] : well developed [Well Nourished] : well nourished [No Acute Distress] : no acute distress [Normal Conjunctiva] : normal conjunctiva [Normal Venous Pressure] : normal venous pressure [No Carotid Bruit] : no carotid bruit [Normal S1, S2] : normal S1, S2 [No Murmur] : no murmur [No Rub] : no rub [No Gallop] : no gallop [Clear Lung Fields] : clear lung fields [Good Air Entry] : good air entry [No Respiratory Distress] : no respiratory distress  [Soft] : abdomen soft [Non Tender] : non-tender [No Masses/organomegaly] : no masses/organomegaly [Normal Bowel Sounds] : normal bowel sounds [Normal Gait] : normal gait [No Edema] : no edema [No Cyanosis] : no cyanosis [No Varicosities] : no varicosities [No Clubbing] : no clubbing [No Rash] : no rash [No Skin Lesions] : no skin lesions [Moves all extremities] : moves all extremities [No Focal Deficits] : no focal deficits [Alert and Oriented] : alert and oriented [Normal memory] : normal memory

## 2024-03-18 NOTE — HISTORY OF PRESENT ILLNESS
[FreeTextEntry1] : SANDRA MORALES is a 23 year old man with Autism, HLD and HTN who presents for follow up today.   He is here with his mom today. His SBP is in the 120s at home. The patient is unable to describe pain. She says she does not notice him clutching his chest or short of breath. She has not noticed any leg swelling. He has not had any syncopal episodes.   He goes to the gym six days a week for an hour. They have been portion controlling his diet. He has been having some mood swings lately and has been eating poorly so has gained a little weight.

## 2024-03-19 LAB
CHOLEST SERPL-MCNC: 182 MG/DL
ESTIMATED AVERAGE GLUCOSE: 108 MG/DL
HBA1C MFR BLD HPLC: 5.4 %
HDLC SERPL-MCNC: 32 MG/DL
LDLC SERPL CALC-MCNC: 118 MG/DL
NONHDLC SERPL-MCNC: 150 MG/DL
TRIGL SERPL-MCNC: 162 MG/DL

## 2024-04-15 ENCOUNTER — APPOINTMENT (OUTPATIENT)
Dept: INTERNAL MEDICINE | Facility: CLINIC | Age: 24
End: 2024-04-15

## 2024-04-15 ENCOUNTER — OUTPATIENT (OUTPATIENT)
Dept: OUTPATIENT SERVICES | Facility: HOSPITAL | Age: 24
LOS: 1 days | End: 2024-04-15
Payer: MEDICAID

## 2024-04-15 VITALS
HEIGHT: 71 IN | WEIGHT: 238 LBS | SYSTOLIC BLOOD PRESSURE: 123 MMHG | HEART RATE: 87 BPM | OXYGEN SATURATION: 100 % | TEMPERATURE: 97.3 F | DIASTOLIC BLOOD PRESSURE: 80 MMHG | BODY MASS INDEX: 33.32 KG/M2

## 2024-04-15 DIAGNOSIS — Z00.00 ENCOUNTER FOR GENERAL ADULT MEDICAL EXAMINATION WITHOUT ABNORMAL FINDINGS: ICD-10-CM

## 2024-04-15 DIAGNOSIS — I10 ESSENTIAL (PRIMARY) HYPERTENSION: ICD-10-CM

## 2024-04-15 DIAGNOSIS — Z00.00 ENCOUNTER FOR GENERAL ADULT MEDICAL EXAMINATION W/OUT ABNORMAL FINDINGS: ICD-10-CM

## 2024-04-15 DIAGNOSIS — E78.00 PURE HYPERCHOLESTEROLEMIA, UNSPECIFIED: ICD-10-CM

## 2024-04-15 DIAGNOSIS — F84.0 AUTISTIC DISORDER: ICD-10-CM

## 2024-04-15 PROCEDURE — 99214 OFFICE O/P EST MOD 30 MIN: CPT

## 2024-04-15 NOTE — REVIEW OF SYSTEMS
[Negative] : Integumentary [FreeTextEntry4] : Allergies [de-identified] : Autism [de-identified] : Autism.  [de-identified] : Neutropenia

## 2024-04-15 NOTE — REVIEW OF SYSTEMS
[Negative] : Integumentary [FreeTextEntry4] : Allergies [de-identified] : Autism [de-identified] : Autism.  [de-identified] : Neutropenia

## 2024-04-15 NOTE — ASSESSMENT
[FreeTextEntry1] : SANDRA MORALES is a 23 year old man with Autism, HLD and HTN who presents for follow up today   #Autism - continue with abilify, aripiprazole - paperwork for day program filled out - quantiferon, hep surface Ag/Ab ordered  #HTN/HLD - not on any meds  HCM: - quantiferon, hep B surface Ag/Ab ordered

## 2024-04-15 NOTE — HISTORY OF PRESENT ILLNESS
[Family Member] : family member [FreeTextEntry1] : SANDRA MORALES is a 23 year old man with Autism, HLD and HTN who presents for follow up today. Has paperwork he needs to filled out for day program. No complaints today as per pts mother and brother.

## 2024-04-17 DIAGNOSIS — E78.00 PURE HYPERCHOLESTEROLEMIA, UNSPECIFIED: ICD-10-CM

## 2024-04-17 DIAGNOSIS — F84.0 AUTISTIC DISORDER: ICD-10-CM

## 2024-04-17 DIAGNOSIS — I10 ESSENTIAL (PRIMARY) HYPERTENSION: ICD-10-CM

## 2024-05-20 ENCOUNTER — EMERGENCY (EMERGENCY)
Facility: HOSPITAL | Age: 24
LOS: 0 days | Discharge: HOME | End: 2024-05-20
Attending: EMERGENCY MEDICINE
Payer: MEDICAID

## 2024-05-20 VITALS
WEIGHT: 235.45 LBS | HEIGHT: 72 IN | SYSTOLIC BLOOD PRESSURE: 135 MMHG | TEMPERATURE: 98 F | DIASTOLIC BLOOD PRESSURE: 87 MMHG | OXYGEN SATURATION: 98 % | RESPIRATION RATE: 20 BRPM | HEART RATE: 83 BPM
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
## 2024-05-20 DIAGNOSIS — S01.552A OPEN BITE OF ORAL CAVITY, INITIAL ENCOUNTER: ICD-10-CM

## 2024-05-20 DIAGNOSIS — Z88.0 ALLERGY STATUS TO PENICILLIN: ICD-10-CM
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
## 2024-05-20 DIAGNOSIS — F84.0 AUTISTIC DISORDER: ICD-10-CM

## 2024-05-20 DIAGNOSIS — Q21.10 ATRIAL SEPTAL DEFECT, UNSPECIFIED: ICD-10-CM

## 2024-05-20 PROCEDURE — 99284 EMERGENCY DEPT VISIT MOD MDM: CPT

## 2024-05-20 PROCEDURE — 99283 EMERGENCY DEPT VISIT LOW MDM: CPT

## 2024-05-20 RX ORDER — CHLORHEXIDINE GLUCONATE 213 G/1000ML
15 SOLUTION TOPICAL
Qty: 1 | Refills: 0
Start: 2024-05-20 | End: 2024-05-26

## 2024-05-20 NOTE — ED PROVIDER NOTE - PATIENT PORTAL LINK FT
You can access the FollowMyHealth Patient Portal offered by [facility] by registering at the following website: [email or web address]. By joining [employer]’s FollowMyHealth portal, you will also be able to view your health information using other applications (apps) compatible with our system.

## 2024-05-20 NOTE — CONSULT NOTE ADULT - ASSESSMENT
Patient is a 24y old  Male who presents with a chief complaint of pain on the left side of his tongue and lower lip (swollen).     HPI:      PAST MEDICAL & SURGICAL HISTORY:  Autism      No significant past surgical history        (   ) heart valve replacement  (   ) joint replacement  (   ) pregnancy    MEDICATIONS  (STANDING):    MEDICATIONS  (PRN):      Allergies    penicillin (Short breath; Rash)    Intolerances        FAMILY HISTORY:  No pertinent family history in first degree relatives        *SOCIAL HISTORY: (   ) Tobacco; (   ) ETOH    *Last Dental Visit:    Vital Signs Last 24 Hrs  T(C): 36.8 (20 May 2024 15:06), Max: 36.8 (20 May 2024 15:06)  T(F): 98.2 (20 May 2024 15:06), Max: 98.2 (20 May 2024 15:06)  HR: 83 (20 May 2024 15:06) (83 - 83)  BP: 135/87 (20 May 2024 15:06) (135/87 - 135/87)  BP(mean): --  RR: 20 (20 May 2024 15:06) (20 - 20)  SpO2: 98% (20 May 2024 15:06) (98% - 98%)    Parameters below as of 20 May 2024 15:06  Patient On (Oxygen Delivery Method): room air        LABS:                  EOE:  TMJ (   ) clicks                     (   ) pops                     (   ) crepitus             Mandible <<FROM>>             Facial bones and MOM <<grossly intact>>             (   ) trismus             (   ) lymphadenopathy             (   ) swelling             (   ) asymmetry             (   ) palpation             (   ) dyspnea             (   ) dysphagia             (   ) loss of consciousness    IOE:  <<permanent/primary/mixed>> dentition: <<grossly intact>> OR <<multiple carious teeth>> OR <<multiple missing teeth>>           hard/soft palate:  (   ) palatal torus, <<No pathology noted>>           tongue/FOM <<No pathology noted>>           labial/buccal mucosa <<No pathology noted>>           (   ) percussion           (   ) palpation           (   ) swelling            (   ) abscess           (   ) sinus tract    Dentition present: <<   >>  Mobility: <<  >>  Caries: <<   >>     *DENTAL RADIOGRAPHS:    RADIOLOGY & ADDITIONAL STUDIES:    *ASSESSMENT: Patient received local anesthesia from his local dentist and bit down on his tongue and lip (left side) shortly after due to numbness. Patient's tongue is bleeding slightly (only slightly torn). Patient's lip is swollen (not a significant laceration).     *PLAN: monitor over time and wait for lesion to heal. Peridex rinse 2x/day. If lesion worsens, visit private dentist for antibiotics.     RECOMMENDATIONS:  1) Peridex 2x/day  2) Dental F/U with outpatient dentist for comprehensive dental care.   3) If any difficulty swallowing/breathing, fever occur, return to ER.     Adán Aguilar, [phone]

## 2024-05-20 NOTE — ED ADULT NURSE NOTE - NSFALLUNIVINTERV_ED_ALL_ED
Communicate risk of Fall with Harm to all staff, patient, and family/Monitor gait and stability/Use of alarms - bed, stretcher, chair and/or video monitoring/Bed/Stretcher in lowest position, wheels locked, appropriate side rails in place/Call bell, personal items and telephone in reach/Instruct patient to call for assistance before getting out of bed/chair/stretcher/Non-slip footwear applied when patient is off stretcher/[place] to call system/Physically safe environment - no spills, clutter or unnecessary equipment/Purposeful proactive rounding/Room/bathroom lighting operational, light cord in reach

## 2024-05-20 NOTE — ED ADULT NURSE NOTE - NSFALLOOBATTEMPT_ED_ALL_ED
No
Constitutional: no fevers or chills  Cardiac: no palpitations, chest pain  Lungs: no shortness of breath, wheezes  Abd: +abd pain, +nausea, + vomiting, +diarrhea  Genitourinary: no dysuria, increased urinary frequency, hematuria  Neurology: no sensorimotor deficits, no dizziness, no headache, no visual changes  Skin: no rashes  All other ROS negative except as per HPI

## 2024-05-20 NOTE — ED PROVIDER NOTE - CLINICAL SUMMARY MEDICAL DECISION MAKING FREE TEXT BOX
Patient with history of ASD presenting for evaluation of bleeding from tongue.  Per family, patient had cavity filled approximately 1 PM, received local anesthetic, subsequently noted patient to be biting his tongue, chewing his tongue and lower lip.  No other acute complaints.  Patient not showing his tongue/lip at this time.  Well appearing, NAD, non toxic. NCAT PERRLA EOMI neck supple non tender normal wob.  Insert airway.  Mildly macerated left aspect of tongue, to left lower lip.  No active bleeding at this time.  Comfortable with discharge and follow-up outpatient, strict return precautions given. Endorses understanding of all of this and aware that they can return at any time for new or concerning symptoms. No further questions or concerns at this time

## 2024-05-20 NOTE — ED PROVIDER NOTE - PHYSICAL EXAMINATION
VITAL SIGNS: I have reviewed nursing notes and confirm.  CONSTITUTIONAL: Well-developed; well-nourished  SKIN:  skin exam is warm and dry, no acute rash.    HEAD: Normocephalic; atraumatic.  EYES: conjunctiva and sclera clear.  ENT: macerated left side of tongue No nasal discharge; airway clear..  EXT: Normal ROM.  No clubbing, cyanosis or edema.   NEURO: Alert

## 2024-05-26 ENCOUNTER — NON-APPOINTMENT (OUTPATIENT)
Age: 24
End: 2024-05-26

## 2024-07-02 ENCOUNTER — APPOINTMENT (OUTPATIENT)
Dept: OTOLARYNGOLOGY | Facility: CLINIC | Age: 24
End: 2024-07-02
Payer: MEDICAID

## 2024-07-02 DIAGNOSIS — J30.9 ALLERGIC RHINITIS, UNSPECIFIED: ICD-10-CM

## 2024-07-02 DIAGNOSIS — J34.89 OTHER SPECIFIED DISORDERS OF NOSE AND NASAL SINUSES: ICD-10-CM

## 2024-07-02 DIAGNOSIS — H61.20 IMPACTED CERUMEN, UNSPECIFIED EAR: ICD-10-CM

## 2024-07-02 PROCEDURE — 69210 REMOVE IMPACTED EAR WAX UNI: CPT

## 2024-07-15 ENCOUNTER — APPOINTMENT (OUTPATIENT)
Dept: CARDIOLOGY | Facility: CLINIC | Age: 24
End: 2024-07-15
Payer: MEDICAID

## 2024-07-15 VITALS
HEART RATE: 85 BPM | WEIGHT: 234 LBS | DIASTOLIC BLOOD PRESSURE: 80 MMHG | BODY MASS INDEX: 32.76 KG/M2 | SYSTOLIC BLOOD PRESSURE: 120 MMHG | HEIGHT: 71 IN

## 2024-07-15 DIAGNOSIS — I10 ESSENTIAL (PRIMARY) HYPERTENSION: ICD-10-CM

## 2024-07-15 DIAGNOSIS — E78.00 PURE HYPERCHOLESTEROLEMIA, UNSPECIFIED: ICD-10-CM

## 2024-07-15 PROCEDURE — 93000 ELECTROCARDIOGRAM COMPLETE: CPT

## 2024-07-15 PROCEDURE — 99213 OFFICE O/P EST LOW 20 MIN: CPT

## 2024-07-15 PROCEDURE — G2211 COMPLEX E/M VISIT ADD ON: CPT | Mod: NC,1L

## 2024-07-29 ENCOUNTER — NON-APPOINTMENT (OUTPATIENT)
Age: 24
End: 2024-07-29

## 2024-08-08 ENCOUNTER — NON-APPOINTMENT (OUTPATIENT)
Age: 24
End: 2024-08-08

## 2024-10-21 ENCOUNTER — APPOINTMENT (OUTPATIENT)
Dept: INTERNAL MEDICINE | Facility: CLINIC | Age: 24
End: 2024-10-21

## 2024-12-08 ENCOUNTER — NON-APPOINTMENT (OUTPATIENT)
Age: 24
End: 2024-12-08

## 2024-12-30 ENCOUNTER — NON-APPOINTMENT (OUTPATIENT)
Age: 24
End: 2024-12-30

## 2025-01-03 ENCOUNTER — NON-APPOINTMENT (OUTPATIENT)
Age: 25
End: 2025-01-03

## 2025-02-24 ENCOUNTER — APPOINTMENT (OUTPATIENT)
Dept: OTOLARYNGOLOGY | Facility: CLINIC | Age: 25
End: 2025-02-24

## 2025-04-22 ENCOUNTER — NON-APPOINTMENT (OUTPATIENT)
Age: 25
End: 2025-04-22

## 2025-05-25 ENCOUNTER — NON-APPOINTMENT (OUTPATIENT)
Age: 25
End: 2025-05-25

## 2025-05-25 ENCOUNTER — EMERGENCY (EMERGENCY)
Facility: HOSPITAL | Age: 25
LOS: 0 days | Discharge: HOME | End: 2025-05-26
Attending: EMERGENCY MEDICINE
Payer: MEDICAID

## 2025-05-25 VITALS
DIASTOLIC BLOOD PRESSURE: 84 MMHG | OXYGEN SATURATION: 97 % | SYSTOLIC BLOOD PRESSURE: 133 MMHG | HEART RATE: 131 BPM | TEMPERATURE: 99 F | RESPIRATION RATE: 18 BRPM | HEIGHT: 72 IN | WEIGHT: 240.08 LBS

## 2025-05-25 PROCEDURE — 80053 COMPREHEN METABOLIC PANEL: CPT

## 2025-05-25 PROCEDURE — 0241U: CPT

## 2025-05-25 PROCEDURE — 99285 EMERGENCY DEPT VISIT HI MDM: CPT | Mod: 25

## 2025-05-25 PROCEDURE — 83735 ASSAY OF MAGNESIUM: CPT

## 2025-05-25 PROCEDURE — 85730 THROMBOPLASTIN TIME PARTIAL: CPT

## 2025-05-25 PROCEDURE — 36000 PLACE NEEDLE IN VEIN: CPT

## 2025-05-25 PROCEDURE — 99285 EMERGENCY DEPT VISIT HI MDM: CPT

## 2025-05-25 PROCEDURE — 36415 COLL VENOUS BLD VENIPUNCTURE: CPT

## 2025-05-25 PROCEDURE — 85610 PROTHROMBIN TIME: CPT

## 2025-05-25 PROCEDURE — 71045 X-RAY EXAM CHEST 1 VIEW: CPT

## 2025-05-25 PROCEDURE — 93005 ELECTROCARDIOGRAM TRACING: CPT

## 2025-05-25 PROCEDURE — 85025 COMPLETE CBC W/AUTO DIFF WBC: CPT

## 2025-05-25 PROCEDURE — 94640 AIRWAY INHALATION TREATMENT: CPT

## 2025-05-25 PROCEDURE — 93010 ELECTROCARDIOGRAM REPORT: CPT

## 2025-05-25 RX ORDER — ACETAMINOPHEN 500 MG/5ML
650 LIQUID (ML) ORAL ONCE
Refills: 0 | Status: COMPLETED | OUTPATIENT
Start: 2025-05-25 | End: 2025-05-25

## 2025-05-25 RX ORDER — IPRATROPIUM BROMIDE AND ALBUTEROL SULFATE .5; 2.5 MG/3ML; MG/3ML
3 SOLUTION RESPIRATORY (INHALATION) ONCE
Refills: 0 | Status: DISCONTINUED | OUTPATIENT
Start: 2025-05-25 | End: 2025-05-25

## 2025-05-25 RX ORDER — SODIUM CHLORIDE 9 G/1000ML
1000 INJECTION, SOLUTION INTRAVENOUS ONCE
Refills: 0 | Status: COMPLETED | OUTPATIENT
Start: 2025-05-25 | End: 2025-05-25

## 2025-05-25 NOTE — ED ADULT NURSE NOTE - OBJECTIVE STATEMENT
pt sent in from urgent care for cough and rhinitis for the past two days.   denies fevers at home but states there are others sick in household

## 2025-05-25 NOTE — ED ADULT NURSE NOTE - NSFALLUNIVINTERV_ED_ALL_ED
Bed/Stretcher in lowest position, wheels locked, appropriate side rails in place/Call bell, personal items and telephone in reach/Instruct patient to call for assistance before getting out of bed/chair/stretcher/Non-slip footwear applied when patient is off stretcher/[place] to call system/Physically safe environment - no spills, clutter or unnecessary equipment/Purposeful proactive rounding/Room/bathroom lighting operational, light cord in reach

## 2025-05-25 NOTE — ED ADULT TRIAGE NOTE - CHIEF COMPLAINT QUOTE
Sent from urgent care.  Cough and rhinitis for the past two days.  No fevers.  Others sick in household.  Took Benadryl and thorazine at 10pm.

## 2025-05-26 VITALS — HEART RATE: 95 BPM | OXYGEN SATURATION: 98 % | RESPIRATION RATE: 17 BRPM

## 2025-05-26 LAB
ALBUMIN SERPL ELPH-MCNC: 4.2 G/DL — SIGNIFICANT CHANGE UP (ref 3.5–5.2)
ALP SERPL-CCNC: 47 U/L — SIGNIFICANT CHANGE UP (ref 30–115)
ALT FLD-CCNC: 19 U/L — SIGNIFICANT CHANGE UP (ref 0–41)
ANION GAP SERPL CALC-SCNC: 11 MMOL/L — SIGNIFICANT CHANGE UP (ref 7–14)
ANISOCYTOSIS BLD QL: SLIGHT — SIGNIFICANT CHANGE UP
APTT BLD: 31.2 SEC — SIGNIFICANT CHANGE UP (ref 27–39.2)
AST SERPL-CCNC: 16 U/L — SIGNIFICANT CHANGE UP (ref 0–41)
BASOPHILS # BLD AUTO: 0.07 K/UL — SIGNIFICANT CHANGE UP (ref 0–0.2)
BASOPHILS NFR BLD AUTO: 0.9 % — SIGNIFICANT CHANGE UP (ref 0–1)
BILIRUB SERPL-MCNC: 0.2 MG/DL — SIGNIFICANT CHANGE UP (ref 0.2–1.2)
BUN SERPL-MCNC: 12 MG/DL — SIGNIFICANT CHANGE UP (ref 10–20)
CALCIUM SERPL-MCNC: 9.5 MG/DL — SIGNIFICANT CHANGE UP (ref 8.4–10.5)
CHLORIDE SERPL-SCNC: 104 MMOL/L — SIGNIFICANT CHANGE UP (ref 98–110)
CO2 SERPL-SCNC: 27 MMOL/L — SIGNIFICANT CHANGE UP (ref 17–32)
CREAT SERPL-MCNC: 0.9 MG/DL — SIGNIFICANT CHANGE UP (ref 0.7–1.5)
EGFR: 122 ML/MIN/1.73M2 — SIGNIFICANT CHANGE UP
EGFR: 122 ML/MIN/1.73M2 — SIGNIFICANT CHANGE UP
EOSINOPHIL # BLD AUTO: 0.26 K/UL — SIGNIFICANT CHANGE UP (ref 0–0.7)
EOSINOPHIL NFR BLD AUTO: 3.6 % — SIGNIFICANT CHANGE UP (ref 0–8)
FLUAV AG NPH QL: SIGNIFICANT CHANGE UP
FLUBV AG NPH QL: SIGNIFICANT CHANGE UP
GIANT PLATELETS BLD QL SMEAR: PRESENT — SIGNIFICANT CHANGE UP
GLUCOSE SERPL-MCNC: 101 MG/DL — HIGH (ref 70–99)
HCT VFR BLD CALC: 43 % — SIGNIFICANT CHANGE UP (ref 42–52)
HGB BLD-MCNC: 14.5 G/DL — SIGNIFICANT CHANGE UP (ref 14–18)
INR BLD: 1.08 RATIO — SIGNIFICANT CHANGE UP (ref 0.65–1.3)
LYMPHOCYTES # BLD AUTO: 2.21 K/UL — SIGNIFICANT CHANGE UP (ref 1.2–3.4)
LYMPHOCYTES # BLD AUTO: 30.3 % — SIGNIFICANT CHANGE UP (ref 20.5–51.1)
MAGNESIUM SERPL-MCNC: 2.4 MG/DL — SIGNIFICANT CHANGE UP (ref 1.8–2.4)
MANUAL SMEAR VERIFICATION: SIGNIFICANT CHANGE UP
MCHC RBC-ENTMCNC: 28.3 PG — SIGNIFICANT CHANGE UP (ref 27–31)
MCHC RBC-ENTMCNC: 33.7 G/DL — SIGNIFICANT CHANGE UP (ref 32–37)
MCV RBC AUTO: 84 FL — SIGNIFICANT CHANGE UP (ref 80–94)
METAMYELOCYTES # FLD: 0.9 % — HIGH (ref 0–0)
METAMYELOCYTES NFR BLD: 0.9 % — HIGH (ref 0–0)
MICROCYTES BLD QL: SLIGHT — SIGNIFICANT CHANGE UP
MONOCYTES # BLD AUTO: 0.52 K/UL — SIGNIFICANT CHANGE UP (ref 0.1–0.6)
MONOCYTES NFR BLD AUTO: 7.1 % — SIGNIFICANT CHANGE UP (ref 1.7–9.3)
NEUTROPHILS # BLD AUTO: 3.98 K/UL — SIGNIFICANT CHANGE UP (ref 1.4–6.5)
NEUTROPHILS NFR BLD AUTO: 53.6 % — SIGNIFICANT CHANGE UP (ref 42.2–75.2)
NEUTS BAND # BLD: 0.9 % — SIGNIFICANT CHANGE UP (ref 0–6)
NEUTS BAND NFR BLD: 0.9 % — SIGNIFICANT CHANGE UP (ref 0–6)
PLAT MORPH BLD: ABNORMAL
PLATELET # BLD AUTO: 240 K/UL — SIGNIFICANT CHANGE UP (ref 130–400)
PMV BLD: 10.1 FL — SIGNIFICANT CHANGE UP (ref 7.4–10.4)
POLYCHROMASIA BLD QL SMEAR: SLIGHT — SIGNIFICANT CHANGE UP
POTASSIUM SERPL-MCNC: 4.1 MMOL/L — SIGNIFICANT CHANGE UP (ref 3.5–5)
POTASSIUM SERPL-SCNC: 4.1 MMOL/L — SIGNIFICANT CHANGE UP (ref 3.5–5)
PROT SERPL-MCNC: 6.9 G/DL — SIGNIFICANT CHANGE UP (ref 6–8)
PROTHROM AB SERPL-ACNC: 12.8 SEC — SIGNIFICANT CHANGE UP (ref 9.95–12.87)
RBC # BLD: 5.12 M/UL — SIGNIFICANT CHANGE UP (ref 4.7–6.1)
RBC # FLD: 12.7 % — SIGNIFICANT CHANGE UP (ref 11.5–14.5)
RBC BLD AUTO: ABNORMAL
RSV RNA NPH QL NAA+NON-PROBE: SIGNIFICANT CHANGE UP
SARS-COV-2 RNA SPEC QL NAA+PROBE: SIGNIFICANT CHANGE UP
SMUDGE CELLS # BLD: PRESENT — SIGNIFICANT CHANGE UP
SODIUM SERPL-SCNC: 142 MMOL/L — SIGNIFICANT CHANGE UP (ref 135–146)
SOURCE RESPIRATORY: SIGNIFICANT CHANGE UP
VARIANT LYMPHS # BLD: 2.7 % — SIGNIFICANT CHANGE UP (ref 0–5)
VARIANT LYMPHS NFR BLD MANUAL: 2.7 % — SIGNIFICANT CHANGE UP (ref 0–5)
WBC # BLD: 7.3 K/UL — SIGNIFICANT CHANGE UP (ref 4.8–10.8)
WBC # FLD AUTO: 7.3 K/UL — SIGNIFICANT CHANGE UP (ref 4.8–10.8)

## 2025-05-26 PROCEDURE — 71045 X-RAY EXAM CHEST 1 VIEW: CPT | Mod: 26

## 2025-05-26 RX ORDER — AZITHROMYCIN 250 MG
1 CAPSULE ORAL
Qty: 6 | Refills: 0
Start: 2025-05-26 | End: 2025-05-30

## 2025-05-26 RX ADMIN — SODIUM CHLORIDE 1000 MILLILITER(S): 9 INJECTION, SOLUTION INTRAVENOUS at 00:17

## 2025-05-26 RX ADMIN — Medication 650 MILLIGRAM(S): at 00:17

## 2025-05-26 RX ADMIN — Medication 500 MICROGRAM(S): at 00:17

## 2025-05-26 NOTE — ED PROVIDER NOTE - PHYSICAL EXAMINATION
CONSTITUTIONAL: non-toxic appearing male, calm and cooperative, no acute distress  SKIN: skin exam is warm and dry,  HEAD: Normocephalic; atraumatic  EYES: PERRL,  conjunctiva and sclera clear.  ENT: MMM, +rhinorrhea, +nasal congestion, tolerating secretions, no exudates or stridor, no muffled voice   CARD: tachycardiac  RESP: No wheezes, rales or rhonchi. Good air movement bilaterally  ABD: soft; non-distended; non-tender.   EXT: Normal ROM.   NEURO: awake, following commands. gcs 15  PSYCH: Cooperative

## 2025-05-26 NOTE — ED PROVIDER NOTE - ATTENDING APP SHARED VISIT CONTRIBUTION OF CARE
I have personally performed a history and physical exam on this patient. I have personally directed the management of the patient.  Patient is c/o nasal congestion/cough; +sick contacts.  Lungs: B/L air entry, NO wheezing/crackles,   Abd: +BS, NT, ND, soft, no rebound, no guarding. No CVA tenderness, no rash.  A/P: URI,   r/o pneumonia,   Labs, CXR, EKG,   reevaluation.

## 2025-05-26 NOTE — ED PROVIDER NOTE - OBJECTIVE STATEMENT
25 year old male, past medical history autism, who presents for eval. patient with nasal congestion, rhinorrhea and cough that began x3 days ago. +sick contacts @ home. patient tachycardiac at urgent care and sent to ed for eval. patient's family did not give patient tylenol/motrin for symptoms. denies shortness of breath, vomiting, diarrhea, rash. patient acting @ baseline.

## 2025-05-26 NOTE — ED PROVIDER NOTE - DIFFERENTIAL DIAGNOSIS
Differential Diagnosis Electrolyte abnormalities, dehydration, OXANA, hyperglycemia, hypoglycemia, symptomatic anemia.  r/o pneumonia.

## 2025-05-26 NOTE — ED PROVIDER NOTE - EKG/XRAY ADDITIONAL INFORMATION
EKG shows sinus tachycardia, HR is 131, no STEMI.   Chest X-rays reviewed and interpreted by me Dr. Conte and shows no actue findings. No Pneumothorax, no free air, no effusions, and these findings discussed with patient.

## 2025-05-26 NOTE — ED PROVIDER NOTE - NSFOLLOWUPINSTRUCTIONS_ED_ALL_ED_FT
Please follow up with your primary care doctor in 1-3 days  Please be aware of any new or worsening signs or symptoms that should prompt your return to the ER.    Upper Respiratory Infection, Adult  An upper respiratory infection (URI) is a common viral infection of the nose, throat, and upper air passages that lead to the lungs. The most common type of URI is the common cold. URIs usually get better on their own, without medical treatment.    What are the causes?  A URI is caused by a virus. You may catch a virus by:    Breathing in droplets from an infected person's cough or sneeze.  Touching something that has been exposed to the virus (contaminated) and then touching your mouth, nose, or eyes.    What increases the risk?  You are more likely to get a URI if:    You are very young or very old.  It is alex or winter.  You have close contact with others, such as at a , school, or health care facility.  You smoke.  You have long-term (chronic) heart or lung disease.  You have a weakened disease-fighting (immune) system.  You have nasal allergies or asthma.  You are experiencing a lot of stress.  You work in an area that has poor air circulation.  You have poor nutrition.    What are the signs or symptoms?  A URI usually involves some of the following symptoms:    Runny or stuffy (congested) nose.  Sneezing.  Cough.  Sore throat.  Headache.  Fatigue.  Fever.  Loss of appetite.  Pain in your forehead, behind your eyes, and over your cheekbones (sinus pain).  Muscle aches.  Redness or irritation of the eyes.  Pressure in the ears or face.    How is this diagnosed?  This condition may be diagnosed based on your medical history and symptoms, and a physical exam. Your health care provider may use a cotton swab to take a mucus sample from your nose (nasal swab). This sample can be tested to determine what virus is causing the illness.    How is this treated?  URIs usually get better on their own within 7–10 days. You can take steps at home to relieve your symptoms. Medicines cannot cure URIs, but your health care provider may recommend certain medicines to help relieve symptoms, such as:    Over-the-counter cold medicines.  Cough suppressants. Coughing is a type of defense against infection that helps to clear the respiratory system, so take these medicines only as recommended by your health care provider.  Fever-reducing medicines.    Follow these instructions at home:  Activity     Rest as needed.  If you have a fever, stay home from work or school until your fever is gone or until your health care provider says you are no longer contagious. Your health care provider may have you wear a face mask to prevent your infection from spreading.  Relieving symptoms     Gargle with a salt-water mixture 3–4 times a day or as needed. To make a salt-water mixture, completely dissolve ½–1 tsp of salt in 1 cup of warm water.  Use a cool-mist humidifier to add moisture to the air. This can help you breathe more easily.  Eating and drinking     Drink enough fluid to keep your urine pale yellow.  ImageEat soups and other clear broths.  General instructions     Take over-the-counter and prescription medicines only as told by your health care provider. These include cold medicines, fever reducers, and cough suppressants.  Do not use any products that contain nicotine or tobacco, such as cigarettes and e-cigarettes. If you need help quitting, ask your health care provider.   Stay away from secondhand smoke.  Stay up to date on all immunizations, including the yearly (annual) flu vaccine.  ImageKeep all follow-up visits as told by your health care provider. This is important.  How to prevent the spread of infection to others     ImageURIs can be passed from person to person (are contagious). To prevent the infection from spreading:    Wash your hands often with soap and water. If soap and water are not available, use hand .  Avoid touching your mouth, face, eyes, or nose.  Cough or sneeze into a tissue or your sleeve or elbow instead of into your hand or into the air.    Contact a health care provider if:  You are getting worse instead of better.  You have a fever or chills.  Your mucus is brown or red.  You have yellow or brown discharge coming from your nose.  You have pain in your face, especially when you bend forward.  You have swollen neck glands.  You have pain while swallowing.  You have white areas in the back of your throat.  Get help right away if:  You have shortness of breath that gets worse.  You have severe or persistent:    Headache.  Ear pain.  Sinus pain.  Chest pain.    You have chronic lung disease along with any of the following:    Wheezing.  Prolonged cough.  Coughing up blood.  A change in your usual mucus.    You have a stiff neck.  You have changes in your:    Vision.  Hearing.  Thinking.  Mood.    Summary  An upper respiratory infection (URI) is a common infection of the nose, throat, and upper air passages that lead to the lungs.  A URI is caused by a virus.  URIs usually get better on their own within 7–10 days.  Medicines cannot cure URIs, but your health care provider may recommend certain medicines to help relieve symptoms.  This information is not intended to replace advice given to you by your health care provider. Make sure you discuss any questions you have with your health care provider.

## 2025-05-26 NOTE — ED PROVIDER NOTE - PATIENT PORTAL LINK FT
You can access the FollowMyHealth Patient Portal offered by [facility] by registering at the following website: [email or web address]. By joining [facility]’s FollowMyHealth portal, you will also be able to view your health information using other applications (apps) compatible with our system.

## 2025-05-26 NOTE — ED PROVIDER NOTE - PROGRESS NOTE DETAILS
patient with improvement of vitals. patient requesting [employer], interactive playful, calm and cooperative. shared decision making with patient and family at bedside, patient to be dc on zpak with close outpatient fu. family educated on signs and sx to be aware of that should prompt return to the er.

## 2025-05-26 NOTE — ED PROVIDER NOTE - CARE PROVIDER_API CALL
Odette Campa  Internal Medicine  [address] [place] [address]  Phone: [phone]  Fax: [phone]  Follow Up Time: 1-3 Days

## 2025-07-24 ENCOUNTER — RX RENEWAL (OUTPATIENT)
Age: 25
End: 2025-07-24

## 2025-07-31 ENCOUNTER — APPOINTMENT (OUTPATIENT)
Dept: CARDIOLOGY | Facility: CLINIC | Age: 25
End: 2025-07-31
Payer: MEDICARE

## 2025-07-31 VITALS
WEIGHT: 238 LBS | HEIGHT: 71 IN | BODY MASS INDEX: 33.32 KG/M2 | DIASTOLIC BLOOD PRESSURE: 84 MMHG | SYSTOLIC BLOOD PRESSURE: 121 MMHG

## 2025-07-31 DIAGNOSIS — Z01.810 ENCOUNTER FOR PREPROCEDURAL CARDIOVASCULAR EXAMINATION: ICD-10-CM

## 2025-07-31 DIAGNOSIS — E78.00 PURE HYPERCHOLESTEROLEMIA, UNSPECIFIED: ICD-10-CM

## 2025-07-31 DIAGNOSIS — I10 ESSENTIAL (PRIMARY) HYPERTENSION: ICD-10-CM

## 2025-07-31 PROCEDURE — 93000 ELECTROCARDIOGRAM COMPLETE: CPT

## 2025-07-31 PROCEDURE — 99203 OFFICE O/P NEW LOW 30 MIN: CPT

## 2025-07-31 RX ORDER — CHLORPROMAZINE HYDROCHLORIDE 50 MG/1
50 TABLET, SUGAR COATED ORAL
Refills: 0 | Status: ACTIVE | COMMUNITY

## 2025-08-17 ENCOUNTER — NON-APPOINTMENT (OUTPATIENT)
Age: 25
End: 2025-08-17